# Patient Record
Sex: FEMALE | Race: OTHER | URBAN - METROPOLITAN AREA
[De-identification: names, ages, dates, MRNs, and addresses within clinical notes are randomized per-mention and may not be internally consistent; named-entity substitution may affect disease eponyms.]

---

## 2021-04-21 ENCOUNTER — INITIAL PRENATAL (OUTPATIENT)
Dept: OBGYN CLINIC | Age: 31
End: 2021-04-21

## 2021-04-21 VITALS — WEIGHT: 121.6 LBS

## 2021-04-21 DIAGNOSIS — O20.0 THREATENED ABORTION: Primary | ICD-10-CM

## 2021-04-21 PROBLEM — Z3A.01 LESS THAN 8 WEEKS GESTATION OF PREGNANCY: Status: ACTIVE | Noted: 2021-04-21

## 2021-04-21 PROCEDURE — 99213 OFFICE O/P EST LOW 20 MIN: CPT | Performed by: ADVANCED PRACTICE MIDWIFE

## 2021-04-21 NOTE — PATIENT INSTRUCTIONS
Weeks 6 to 10 of Your Pregnancy: Care Instructions  Overview     During the first 6 to 10 weeks of your pregnancy, your body goes through many changes. Your baby grows very quickly, even though you can't feel it yet. You may start to feel different, both in your body and your emotions. Because each pregnancy is unique, there's no right way to feel. You may feel the healthiest you've ever been, or you might feel tired or sick to your stomach (\"morning sickness\"). These early weeks are a time to make healthy choices and to eat the best foods for you and your baby. This is also a good time to think about birth defects testing. These are tests done during pregnancy to look for possible problems with the baby. First-trimester tests for birth defects can be done between 8 and 13 weeks of pregnancy, depending on the test. Talk with your doctor about what kinds of tests are available. Follow-up care is a key part of your treatment and safety. Be sure to make and go to all appointments, and call your doctor if you are having problems. It's also a good idea to know your test results and keep a list of the medicines you take. How can you care for yourself at home? Eat well  · Eat at least 3 meals and 2 healthy snacks every day. Eat fresh, whole foods, including:  ? 7 or more servings of bread, tortillas, cereal, rice, pasta, or oatmeal.  ? 3 or more servings of vegetables, especially leafy green vegetables. ? 2 or more servings of fruits. ? 3 or more servings of milk, yogurt, or cheese. ? 2 or more servings of meat, turkey, chicken, fish, eggs, or dried beans. · Drink plenty of fluids, especially water. Avoid sodas and other sweetened drinks. · Choose foods that have important vitamins for your baby, such as calcium, iron, and folate. ? Dairy products, tofu, canned fish with bones, almonds, broccoli, dark leafy greens, corn tortillas, and fortified orange juice are good sources of calcium. ?  Beef, poultry, liver, spinach, lentils, dried beans, fortified cereals, and dried fruits are rich in iron. ? Dark leafy greens, broccoli, asparagus, liver, fortified cereals, orange juice, peanuts, and almonds are good sources of folate. · Avoid foods that could harm your baby. ? Do not eat raw or undercooked meat, chicken, or fish (such as sushi or raw oysters). ? Do not eat raw eggs or foods that contain raw eggs, such as Caesar dressing. ? Do not eat soft cheeses and unpasteurized dairy foods, such as Brie, feta, or blue cheese. ? Do not eat fish that contains a lot of mercury, such as shark, swordfish, tilefish, or kimberly mackerel. Do not eat more than 6 ounces of tuna each week. ? Do not eat raw sprouts, especially alfalfa sprouts. ? Cut down on caffeine, such as coffee, tea, and cola. Protect yourself and your baby  · Do not touch merced litter or cat feces. They can cause an infection that could harm your baby. · High body temperature can be harmful to your baby. So if you want to use a sauna or hot tub, be sure to talk to your doctor about how to use it safely. Ava with morning sickness  · Sip small amounts of water, juices, or shakes. Try drinking between meals, not with meals. · Eat 5 or 6 small meals a day. Try dry toast or crackers when you first get up, and eat breakfast a little later. · Avoid spicy, greasy, and fatty foods. · When you feel sick, open your windows or go for a short walk to get fresh air. · Try nausea wristbands. These help some women. · Tell your doctor if you think your prenatal vitamins make you sick. Where can you learn more? Go to http://www.gray.com/  Enter G112 in the search box to learn more about \"Weeks 6 to 10 of Your Pregnancy: Care Instructions. \"  Current as of: October 8, 2020               Content Version: 12.8  © 4748-0265 [employer].    Care instructions adapted under license by [employer] (which disclaims liability or warranty for this information). If you have questions about a medical condition or this instruction, always ask your healthcare professional. [place] [address] any warranty or liability for your use of this information.

## 2021-04-21 NOTE — PROGRESS NOTES
Threatened AB note    Charito Luu is a ,  26 y.o. female OTHER No LMP recorded. Patient is pregnant. Very early had Beta of 453 2 weeks ago       TV ULTRASOUND PERFORMED 21  A POSSIBLE GESTATIONAL SAC IS VISUALIZED IN THE RIGHT ARCH OF THE ENDOMETRIUM. POSSIBLE  ARCUATE UTERUS. EARLY GESTATION VS BLIGHTED OVUM. CLINICA CORRELATION RECOMMENDED. RIGHT OVARY APPEARS WNL. LEFT OVARY APPEARS WNL. NO FREE FLUID SEEN IN THE CDS. No past medical history on file. No past surgical history on file. Social History     Occupational History    Not on file   Tobacco Use    Smoking status: Not on file   Substance and Sexual Activity    Alcohol use: Not on file    Drug use: Not on file    Sexual activity: Not on file     No family history on file.     Not on File  Prior to Admission medications    Not on File        Review of Systems: History obtained from the patient  Constitutional: negative for weight loss, fever, night sweats  Breast: negative for breast lumps, nipple discharge, galactorrhea  GI: negative for change in bowel habits, abdominal pain, black or bloody stools  : negative for frequency, dysuria, hematuria, vaginal discharge  MSK: negative for back pain, joint pain, muscle pain  Skin: negative for itching, rash, hives  Psych: negative for anxiety, depression, change in mood      Objective:  Visit Vitals  Wt 121 lb 9.6 oz (55.2 kg)       Physical Exam:   PHYSICAL EXAMINATION    Constitutional  · Appearance: well-nourished, well developed, alert, in no acute distress    Gastrointestinal  · Abdominal Examination: abdomen non-tender to palpation, normal bowel sounds, no masses present  · Liver and spleen: no hepatomegaly present, spleen not palpable  · Hernias: no hernias identified    Genitourinary  · External Genitalia: normal appearance for age, no discharge present, no tenderness present, no inflammatory lesions present, no masses present, no atrophy present  · Vagina: normal vaginal vault without central or paravaginal defects, bloody discharge present, no inflammatory lesions present, no masses present  · Bladder: non-tender to palpation  · Urethra: appears normal  · Cervix: normal   · Uterus: enlarged, soft, mildly tender with normal shape and consistency  · Adnexa: no adnexal tenderness present, no adnexal masses present  · Perineum: perineum within normal limits, no evidence of trauma, no rashes or skin lesions present  · Anus: anus within normal limits, no hemorrhoids present  · Inguinal Lymph Nodes: no lymphadenopathy present    Skin  · General Inspection: no rash, no lesions identified    Neurologic/Psychiatric  · Mental Status:  · Orientation: grossly oriented to person, place and time  · Mood and Affect: mood normal, affect appropriate    Assessment/Plan:  Threatened AB  Offered BHCG versus repeat scan in 3 weeks - pt and  would prefer repeat scan     Chris Ramirez CNM

## 2022-03-19 PROBLEM — Z3A.01 LESS THAN 8 WEEKS GESTATION OF PREGNANCY: Status: ACTIVE | Noted: 2021-04-21

## 2023-08-03 LAB
C. TRACHOMATIS, EXTERNAL RESULT: NEGATIVE
HEP B, EXTERNAL RESULT: NEGATIVE
HEPATITIS C ANTIBODY, EXTERNAL RESULT: NON REACTIVE
HIV, EXTERNAL RESULT: NON REACTIVE
N. GONORRHOEAE, EXTERNAL RESULT: NEGATIVE
RUBELLA TITER, EXTERNAL RESULT: NORMAL
T. PALLIDUM (SYPHILIS) ANTIBODY, EXTERNAL RESULT: NON REACTIVE

## 2023-11-18 LAB — GBS, EXTERNAL RESULT: NEGATIVE

## 2024-01-24 ENCOUNTER — HOSPITAL ENCOUNTER (OUTPATIENT)
Facility: HOSPITAL | Age: 34
Discharge: HOME | End: 2024-01-24
Attending: OBSTETRICS & GYNECOLOGY | Admitting: OBSTETRICS & GYNECOLOGY
Payer: COMMERCIAL

## 2024-01-24 VITALS
DIASTOLIC BLOOD PRESSURE: 77 MMHG | HEART RATE: 102 BPM | SYSTOLIC BLOOD PRESSURE: 124 MMHG | RESPIRATION RATE: 15 BRPM | OXYGEN SATURATION: 98 % | TEMPERATURE: 97.7 F

## 2024-01-24 LAB
BASOPHILS # BLD: 0 K/UL (ref 0–0.1)
BASOPHILS NFR BLD: 0 % (ref 0–1)
DIFFERENTIAL METHOD BLD: ABNORMAL
EOSINOPHIL # BLD: 0.1 K/UL (ref 0–0.4)
EOSINOPHIL NFR BLD: 2 % (ref 0–7)
ERYTHROCYTE [DISTWIDTH] IN BLOOD BY AUTOMATED COUNT: 13.4 % (ref 11.5–14.5)
HCT VFR BLD AUTO: 34.5 % (ref 35–47)
HGB BLD-MCNC: 11.8 G/DL (ref 11.5–16)
IMM GRANULOCYTES # BLD AUTO: 0.1 K/UL (ref 0–0.04)
IMM GRANULOCYTES NFR BLD AUTO: 2 % (ref 0–0.5)
LYMPHOCYTES # BLD: 1.2 K/UL (ref 0.8–3.5)
LYMPHOCYTES NFR BLD: 20 % (ref 12–49)
MCH RBC QN AUTO: 30 PG (ref 26–34)
MCHC RBC AUTO-ENTMCNC: 34.2 G/DL (ref 30–36.5)
MCV RBC AUTO: 87.8 FL (ref 80–99)
MONOCYTES # BLD: 0.6 K/UL (ref 0–1)
MONOCYTES NFR BLD: 10 % (ref 5–13)
NEUTS SEG # BLD: 4.1 K/UL (ref 1.8–8)
NEUTS SEG NFR BLD: 66 % (ref 32–75)
NRBC # BLD: 0 K/UL (ref 0–0.01)
NRBC BLD-RTO: 0 PER 100 WBC
PLATELET # BLD AUTO: 174 K/UL (ref 150–400)
PMV BLD AUTO: 11.8 FL (ref 8.9–12.9)
RBC # BLD AUTO: 3.93 M/UL (ref 3.8–5.2)
WBC # BLD AUTO: 6.1 K/UL (ref 3.6–11)

## 2024-01-24 PROCEDURE — 85025 COMPLETE CBC W/AUTO DIFF WBC: CPT

## 2024-01-24 PROCEDURE — 36415 COLL VENOUS BLD VENIPUNCTURE: CPT

## 2024-01-24 PROCEDURE — 6360000002 HC RX W HCPCS: Performed by: OBSTETRICS & GYNECOLOGY

## 2024-01-24 PROCEDURE — 86901 BLOOD TYPING SEROLOGIC RH(D): CPT

## 2024-01-24 PROCEDURE — 86900 BLOOD TYPING SEROLOGIC ABO: CPT

## 2024-01-24 PROCEDURE — 86850 RBC ANTIBODY SCREEN: CPT

## 2024-01-24 RX ORDER — TERBUTALINE SULFATE 1 MG/ML
0.25 INJECTION, SOLUTION SUBCUTANEOUS ONCE
Status: COMPLETED | OUTPATIENT
Start: 2024-01-24 | End: 2024-01-24

## 2024-01-24 RX ADMIN — TERBUTALINE SULFATE 0.25 MG: 1 INJECTION, SOLUTION SUBCUTANEOUS at 09:59

## 2024-01-24 NOTE — H&P
History & Physical    Name: Beatrice Soto MRN: [id number]  SSN: [id number]    YOB: 1990  Age: 33 y.o.  Sex: female        Subjective:     Chief Complaint:  Pregnancy and Breech presentation    Estimated Date of Delivery: None noted.  OB History    Para Term  AB Living   2             SAB IAB Ectopic Molar Multiple Live Births                    # Outcome Date GA Lbr Kt/2nd Weight Sex Delivery Anes PTL Lv   1 Current                Ms. Soto is a  admitted with pregnancy at 36  for planned external cephalic version for breech presentation.  She notes active FM and feels good today.  She has no complaints.    Baby has been persistently breech, last u/s yesterday.  CRICKET 17cm.  Anterior placenta.  We discussed risk and benefits of  section vs attempt at ECV and discussed success rates etc.  She would like to proceed with ECV.     Prenatal course was normal - this is an IVF pregnancy and she has been followed with growth scans and now is having weekly surveillance which has been reassuring.      Please see prenatal records which have also been sent to Labor and Delivery and added to Epic for details.    No past medical history on file.  No past surgical history on file.  Social History     Occupational History    Not on file   Tobacco Use    Smoking status: Never    Smokeless tobacco: Never   Vaping Use    Vaping Use: Never used   Substance and Sexual Activity    Alcohol use: Never    Drug use: Never    Sexual activity: Defer     No family history on file.    No Known Allergies  Prior to Admission medications    Not on File        Review of Systems: Pertinent items are noted in HPI.    Objective:     Vitals:  Vitals:    24 0838   BP: 124/77   Pulse: (!) 102   Resp: 15   Temp: 97.7 °F (36.5 °C)   TempSrc: Oral   SpO2: 98%         /77   Pulse (!) 102   Temp 97.7 °F (36.5 °C) (Oral)   Resp 15   SpO2 98%     Lungs:   Respiratory non labored   Heart:   No

## 2024-01-24 NOTE — DISCHARGE INSTRUCTIONS
Week 38 of Your Pregnancy: Care Instructions  Believe it or not, your baby is almost here. You may notice how your baby responds to sounds, warmth, cold, and light. You may even know what kind of music your baby likes.    Even if you expect a vaginal birth, it's a good idea to learn about  section ().  is the delivery of a baby through a cut (incision) in your belly. It's a major surgery, so it has more risks than a vaginal birth.   During the first 2 weeks after the birth, limit visitors. Don't allow visitors who have colds or infections. Ask visitors to wash their hands before they touch your baby. And never let anyone smoke around your baby.     Know about unplanned C-sections.  Reasons for an unplanned  include labor that slows or stops, signs of distress in your baby, and high blood pressure or other problems for you.     Know about planned C-sections.  If your baby isn't in a head-down position for delivery (breech position), your doctor may plan a . Or you may have a planned  if you're having twins or more.     Get as much help as you can while you're in the hospital.  You can learn about feeding, diapering, and bathing your baby.     Talk to your doctor or midwife about how to care for the umbilical cord stump.  If your baby will be circumcised, also ask about how to care for that.     Ask friends or family for help, as you need it.  If you can, have another adult in your home for at least 2 or 3 days after the birth.     Try to nap when your baby naps.  This may be your best chance to get some sleep.     Watch for changes in your mental health.  For the first 1 to 2 weeks after birth, it's common to cry or feel sad or irritable. If these feelings last for more than 2 weeks, you may have postpartum depression. Ask your doctor for help. Postpartum depression can be treated.   Follow-up care is a key part of your treatment and safety. Be sure to make

## 2024-01-24 NOTE — PROGRESS NOTES
0830 - Patient arrived to Labor and Delivery for scheduled external cephalic version.  0917 - Dr Alexey Matta at bedside.    0923 - Verified by Ultrasound baby is breech.    0959 - Dr. Mack and Dr. Matta at bedside for ECV.  1001 - ECV started    1002 - Fetal Heartbeat checked by ultrasound. reassuring    1002 - Position and fetal heart tone check by ultrasound.  Reassuring  1003 - Position and fetal heart tone check by ultrasound. Reassuring.  1004 - Pause procedure for break for Mom.  1005 - Position and fetal heart tone check by ultrasound reassuring  1005 - Restarted procedure    1007 - Position and etal heart tone check by ultrasound. Reassuring  1008 - Position and etal heart tone check by ultrasound. Reassuring   1009 - Vertex confirmed by ultrasound by ultrasound and fetal heart tone reassuring.      Induction will be scheduled.    1125 - Consulted Dr. Matta concerning the contractions seen on the monitor from [facility].  The patient does not feel the contractions that show on the monitor.     Per Dr. Matta we will continue to monitor and give a liter of fluids and offer food.    Fluids are started and patient does not want to eat anything right now.

## 2024-01-24 NOTE — PROCEDURES
External Cephalic Version Procedure Note  Patient - Beatrice Soto  Medical Record Number - [id number]   YOB: 1990      DATE AND TIME OF PROCEDURE: 1/24/2024  10:00am    PREOPERATIVE DIAGNOSIS:   Breech presentation  IVF pregnancy    POSTOPERATIVE DIAGNOSIS:   S/p external cephalic version, successful, now cephalic presentation  IVF pregnancy    PROCEDURE(S):   External cephalic version     ANESTHESIA: None    SURGEON:  Edwina Matta MD    ASSISTANT: Won Mack MD    COMPLICATIONS: none    FINDINGS: Pregnancy at 38w1d in [place] breech presentation, reactive NST pre and post procedure, successful ECV resulting in baby now in cephalic presentation post procedure      Medications given:   Terbutaline IM    DVT Prophylaxis: None required    Pre Procedure NST:  Baseline 140, moderate variability, + accels, no decels - REACTIVE    Pre Procedure toco: no contractions    Post Procedure NST:  Baseline 140, moderate variability, + accels, no decels - REACTIVE    Post procedure toco: no contractions      Procedure Detail:      After proper patient identification and consent, the patient was placed in supine position and bedside ultrasound was used to confirm baby still in [place] breech presentation.  An NST was performed at the bedside and was REACTIVE.  Pt was consented and questions answered.     Olive oil was used to lubricate the abdomen for the procedure.  One physician's hands were placed around the baby's head and the other physician's hands were placed around the buttocks.  The buttocks were attempted to be lifted out of the pelvic and the head was moved carefully in a forward roll motion.  The head initially moved then went back into it's initial location. Heart beat and position were checked multiple times during the procedure.  Attention was then turned back to the head and decision was made to try a backward roll movement.  The buttocks were elevated out of the pelvis and the head was carefully

## 2024-01-25 LAB
ABO + RH BLD: NORMAL
BLOOD GROUP ANTIBODIES SERPL: NORMAL
SPECIMEN EXP DATE BLD: NORMAL

## 2024-02-05 ENCOUNTER — HOSPITAL ENCOUNTER (INPATIENT)
Facility: HOSPITAL | Age: 34
LOS: 4 days | Discharge: HOME | End: 2024-02-09
Attending: OBSTETRICS & GYNECOLOGY | Admitting: OBSTETRICS & GYNECOLOGY
Payer: COMMERCIAL

## 2024-02-05 DIAGNOSIS — Z98.891 S/P CESAREAN SECTION: Primary | ICD-10-CM

## 2024-02-05 PROBLEM — O09.90 HIGH RISK PREGNANCY, ANTEPARTUM: Status: ACTIVE | Noted: 2024-02-05

## 2024-02-05 PROBLEM — O09.813 PREGNANCY RESULTING FROM IN VITRO FERTILIZATION IN THIRD TRIMESTER: Status: ACTIVE | Noted: 2024-02-05

## 2024-02-05 LAB
ABO + RH BLD: NORMAL
ALBUMIN SERPL-MCNC: 2.8 G/DL (ref 3.5–5)
ALBUMIN/GLOB SERPL: 0.8 (ref 1.1–2.2)
ALP SERPL-CCNC: 262 U/L (ref 45–117)
ALT SERPL-CCNC: 18 U/L (ref 12–78)
ANION GAP SERPL CALC-SCNC: 10 MMOL/L (ref 5–15)
AST SERPL-CCNC: 19 U/L (ref 15–37)
BASOPHILS # BLD: 0 K/UL (ref 0–0.1)
BASOPHILS NFR BLD: 0 % (ref 0–1)
BILIRUB SERPL-MCNC: 0.3 MG/DL (ref 0.2–1)
BLOOD GROUP ANTIBODIES SERPL: NORMAL
BUN SERPL-MCNC: 6 MG/DL (ref 6–20)
BUN/CREAT SERPL: 11 (ref 12–20)
CALCIUM SERPL-MCNC: 8.9 MG/DL (ref 8.5–10.1)
CHLORIDE SERPL-SCNC: 108 MMOL/L (ref 97–108)
CO2 SERPL-SCNC: 22 MMOL/L (ref 21–32)
CREAT SERPL-MCNC: 0.54 MG/DL (ref 0.55–1.02)
DIFFERENTIAL METHOD BLD: ABNORMAL
EOSINOPHIL # BLD: 0.1 K/UL (ref 0–0.4)
EOSINOPHIL NFR BLD: 2 % (ref 0–7)
ERYTHROCYTE [DISTWIDTH] IN BLOOD BY AUTOMATED COUNT: 13.2 % (ref 11.5–14.5)
GLOBULIN SER CALC-MCNC: 3.6 G/DL (ref 2–4)
GLUCOSE SERPL-MCNC: 88 MG/DL (ref 65–100)
HCT VFR BLD AUTO: 35.4 % (ref 35–47)
HGB BLD-MCNC: 11.7 G/DL (ref 11.5–16)
IMM GRANULOCYTES # BLD AUTO: 0.1 K/UL (ref 0–0.04)
IMM GRANULOCYTES NFR BLD AUTO: 1 % (ref 0–0.5)
LYMPHOCYTES # BLD: 1.1 K/UL (ref 0.8–3.5)
LYMPHOCYTES NFR BLD: 19 % (ref 12–49)
MCH RBC QN AUTO: 29.7 PG (ref 26–34)
MCHC RBC AUTO-ENTMCNC: 33.1 G/DL (ref 30–36.5)
MCV RBC AUTO: 89.8 FL (ref 80–99)
MONOCYTES # BLD: 0.5 K/UL (ref 0–1)
MONOCYTES NFR BLD: 9 % (ref 5–13)
NEUTS SEG # BLD: 4 K/UL (ref 1.8–8)
NEUTS SEG NFR BLD: 69 % (ref 32–75)
NRBC # BLD: 0 K/UL (ref 0–0.01)
NRBC BLD-RTO: 0 PER 100 WBC
PLATELET # BLD AUTO: 194 K/UL (ref 150–400)
PMV BLD AUTO: 11.2 FL (ref 8.9–12.9)
POTASSIUM SERPL-SCNC: 4.2 MMOL/L (ref 3.5–5.1)
PROT SERPL-MCNC: 6.4 G/DL (ref 6.4–8.2)
RBC # BLD AUTO: 3.94 M/UL (ref 3.8–5.2)
SODIUM SERPL-SCNC: 140 MMOL/L (ref 136–145)
SPECIMEN EXP DATE BLD: NORMAL
WBC # BLD AUTO: 5.8 K/UL (ref 3.6–11)

## 2024-02-05 PROCEDURE — 1100000000 HC RM PRIVATE

## 2024-02-05 PROCEDURE — C1726 CATH, BAL DIL, NON-VASCULAR: HCPCS

## 2024-02-05 PROCEDURE — 86900 BLOOD TYPING SEROLOGIC ABO: CPT

## 2024-02-05 PROCEDURE — 85025 COMPLETE CBC W/AUTO DIFF WBC: CPT

## 2024-02-05 PROCEDURE — 59200 INSERT CERVICAL DILATOR: CPT

## 2024-02-05 PROCEDURE — 86901 BLOOD TYPING SEROLOGIC RH(D): CPT

## 2024-02-05 PROCEDURE — 6360000002 HC RX W HCPCS: Performed by: OBSTETRICS & GYNECOLOGY

## 2024-02-05 PROCEDURE — 36415 COLL VENOUS BLD VENIPUNCTURE: CPT

## 2024-02-05 PROCEDURE — 86850 RBC ANTIBODY SCREEN: CPT

## 2024-02-05 PROCEDURE — 7210000100 HC LABOR FEE PER 1 HR

## 2024-02-05 PROCEDURE — 6370000000 HC RX 637 (ALT 250 FOR IP): Performed by: OBSTETRICS & GYNECOLOGY

## 2024-02-05 PROCEDURE — 80053 COMPREHEN METABOLIC PANEL: CPT

## 2024-02-05 RX ORDER — SODIUM CHLORIDE 0.9 % (FLUSH) 0.9 %
5-40 SYRINGE (ML) INJECTION EVERY 12 HOURS SCHEDULED
Status: DISCONTINUED | OUTPATIENT
Start: 2024-02-05 | End: 2024-02-07 | Stop reason: HOSPADM

## 2024-02-05 RX ORDER — METHYLERGONOVINE MALEATE 0.2 MG/ML
200 INJECTION INTRAVENOUS PRN
Status: DISCONTINUED | OUTPATIENT
Start: 2024-02-05 | End: 2024-02-07

## 2024-02-05 RX ORDER — SODIUM CHLORIDE, SODIUM LACTATE, POTASSIUM CHLORIDE, CALCIUM CHLORIDE 600; 310; 30; 20 MG/100ML; MG/100ML; MG/100ML; MG/100ML
INJECTION, SOLUTION INTRAVENOUS CONTINUOUS
Status: DISCONTINUED | OUTPATIENT
Start: 2024-02-05 | End: 2024-02-07

## 2024-02-05 RX ORDER — SODIUM CHLORIDE, SODIUM LACTATE, POTASSIUM CHLORIDE, AND CALCIUM CHLORIDE .6; .31; .03; .02 G/100ML; G/100ML; G/100ML; G/100ML
500 INJECTION, SOLUTION INTRAVENOUS PRN
Status: DISCONTINUED | OUTPATIENT
Start: 2024-02-05 | End: 2024-02-07 | Stop reason: HOSPADM

## 2024-02-05 RX ORDER — ASPIRIN 81 MG/1
81 TABLET, CHEWABLE ORAL DAILY
Status: ON HOLD | COMMUNITY
End: 2024-02-07

## 2024-02-05 RX ORDER — SODIUM CHLORIDE 9 MG/ML
25 INJECTION, SOLUTION INTRAVENOUS PRN
Status: DISCONTINUED | OUTPATIENT
Start: 2024-02-05 | End: 2024-02-07 | Stop reason: HOSPADM

## 2024-02-05 RX ORDER — ONDANSETRON 2 MG/ML
4 INJECTION INTRAMUSCULAR; INTRAVENOUS EVERY 6 HOURS PRN
Status: DISCONTINUED | OUTPATIENT
Start: 2024-02-05 | End: 2024-02-07

## 2024-02-05 RX ORDER — SODIUM CHLORIDE 0.9 % (FLUSH) 0.9 %
5-40 SYRINGE (ML) INJECTION PRN
Status: DISCONTINUED | OUTPATIENT
Start: 2024-02-05 | End: 2024-02-07 | Stop reason: HOSPADM

## 2024-02-05 RX ORDER — CARBOPROST TROMETHAMINE 250 UG/ML
250 INJECTION, SOLUTION INTRAMUSCULAR PRN
Status: DISCONTINUED | OUTPATIENT
Start: 2024-02-05 | End: 2024-02-07

## 2024-02-05 RX ORDER — DOCUSATE SODIUM 100 MG/1
100 CAPSULE, LIQUID FILLED ORAL 2 TIMES DAILY
Status: DISCONTINUED | OUTPATIENT
Start: 2024-02-05 | End: 2024-02-07

## 2024-02-05 RX ORDER — ACETAMINOPHEN 325 MG/1
650 TABLET ORAL EVERY 4 HOURS PRN
Status: DISCONTINUED | OUTPATIENT
Start: 2024-02-05 | End: 2024-02-07 | Stop reason: HOSPADM

## 2024-02-05 RX ORDER — SODIUM CHLORIDE, SODIUM LACTATE, POTASSIUM CHLORIDE, AND CALCIUM CHLORIDE .6; .31; .03; .02 G/100ML; G/100ML; G/100ML; G/100ML
1000 INJECTION, SOLUTION INTRAVENOUS PRN
Status: DISCONTINUED | OUTPATIENT
Start: 2024-02-05 | End: 2024-02-07 | Stop reason: HOSPADM

## 2024-02-05 RX ORDER — MISOPROSTOL 200 UG/1
800 TABLET ORAL PRN
Status: DISCONTINUED | OUTPATIENT
Start: 2024-02-05 | End: 2024-02-07

## 2024-02-05 RX ORDER — HYDROMORPHONE HYDROCHLORIDE 1 MG/ML
1 INJECTION, SOLUTION INTRAMUSCULAR; INTRAVENOUS; SUBCUTANEOUS
Status: DISCONTINUED | OUTPATIENT
Start: 2024-02-05 | End: 2024-02-07

## 2024-02-05 RX ADMIN — Medication 25 MCG: at 19:17

## 2024-02-05 RX ADMIN — HYDROMORPHONE HYDROCHLORIDE 1 MG: 1 INJECTION, SOLUTION INTRAMUSCULAR; INTRAVENOUS; SUBCUTANEOUS at 21:57

## 2024-02-05 RX ADMIN — ONDANSETRON 4 MG: 2 INJECTION INTRAMUSCULAR; INTRAVENOUS at 21:58

## 2024-02-05 NOTE — PROGRESS NOTES
1730: Patient here for scheduled induction for IVF in pregancy.    1835: Bedside ultrasound confirmed vertex presentation. SVE fingertip/30/high. Perez balloon placed with 60/60 by Dr Mansfield. Plan to leave in until the doctor rounds in the am or if it falls out before.     1930: Bedside and Verbal shift change report given to JOSE MIGUEL Mahajan RN (oncoming nurse) by JONAS Myers RN (offgoing nurse). Report included the following information ED SBAR, MAR, and Recent Results.

## 2024-02-05 NOTE — PROGRESS NOTES
FHTs: 140, moderate variability, positive accelerations, no decelerations  [place]: no contractions    Cervix ftp/30%/-4  Sterile speculum placed, cook placed under direct visualization  60mL sterile saline placed in both the uterine and vaginal balloons

## 2024-02-05 NOTE — H&P
History & Physical    Name: Beatrice Soto MRN: [id number]  SSN: [id number]    YOB: 1990  Age: 33 y.o.  Sex: female      Subjective:     Chief Complaint:  none    Estimated Date of Delivery: 24  OB History    Para Term  AB Living   2             SAB IAB Ectopic Molar Multiple Live Births                    # Outcome Date GA Lbr Kt/2nd Weight Sex Delivery Anes PTL Lv   1 Current                HPI:  32yo  @ 39w6d presents for scheduled IOL due to IVF pregnancy.  She has no labor complaints.     Of note, baby was previously breech.  Last week ECV was successful and the patient feels that her baby is still vertex.      Past Medical History:   Diagnosis Date    Infertility, female     please dont discuss in front of anyone    Pregnancy resulting from in vitro fertilization in third trimester 2024     History reviewed. No pertinent surgical history.  Social History     Occupational History    Not on file   Tobacco Use    Smoking status: Never    Smokeless tobacco: Never   Vaping Use    Vaping Use: Never used   Substance and Sexual Activity    Alcohol use: Never    Drug use: Never    Sexual activity: Defer     History reviewed. No pertinent family history.    No Known Allergies  Prior to Admission medications    Medication Sig Start Date End Date Taking? Authorizing Provider   Prenatal MV-Min-Fe Fum-FA-DHA (PRENATAL 1 PO) Take by mouth   Yes Katherine Santos MD   aspirin 81 MG chewable tablet Take 1 tablet by mouth daily   Yes ProviderKatherine MD        Review of Systems: A comprehensive review of systems was negative except for that written in the History of Present Illness.    Objective:     Vitals:  Vitals:    24 1750   Weight: 71.2 kg (157 lb)   Height: 1.6 m (5' 3\")         Ht 1.6 m (5' 3\")   Wt 71.2 kg (157 lb)   LMP 2023   BMI 27.81 kg/m²       Lungs:   Respiratory non labored   Heart:   No clubbing, cyanosis or edema   Abdomen:  Gravid

## 2024-02-06 ENCOUNTER — ANESTHESIA (OUTPATIENT)
Facility: HOSPITAL | Age: 34
End: 2024-02-06
Payer: COMMERCIAL

## 2024-02-06 ENCOUNTER — ANESTHESIA EVENT (OUTPATIENT)
Facility: HOSPITAL | Age: 34
End: 2024-02-06
Payer: COMMERCIAL

## 2024-02-06 LAB
CREAT UR-MCNC: 63.1 MG/DL
PROT UR-MCNC: 37 MG/DL (ref 0–11.9)
PROT/CREAT UR-RTO: 0.6

## 2024-02-06 PROCEDURE — 2580000003 HC RX 258: Performed by: ANESTHESIOLOGY

## 2024-02-06 PROCEDURE — 7210000100 HC LABOR FEE PER 1 HR

## 2024-02-06 PROCEDURE — 6360000002 HC RX W HCPCS: Performed by: ANESTHESIOLOGY

## 2024-02-06 PROCEDURE — 3700000025 EPIDURAL BLOCK: Performed by: STUDENT IN AN ORGANIZED HEALTH CARE EDUCATION/TRAINING PROGRAM

## 2024-02-06 PROCEDURE — 82570 ASSAY OF URINE CREATININE: CPT

## 2024-02-06 PROCEDURE — 2580000003 HC RX 258: Performed by: OBSTETRICS & GYNECOLOGY

## 2024-02-06 PROCEDURE — 6370000000 HC RX 637 (ALT 250 FOR IP): Performed by: ADVANCED PRACTICE MIDWIFE

## 2024-02-06 PROCEDURE — 1100000000 HC RM PRIVATE

## 2024-02-06 PROCEDURE — 84156 ASSAY OF PROTEIN URINE: CPT

## 2024-02-06 PROCEDURE — 51702 INSERT TEMP BLADDER CATH: CPT

## 2024-02-06 PROCEDURE — 6360000002 HC RX W HCPCS: Performed by: OBSTETRICS & GYNECOLOGY

## 2024-02-06 RX ORDER — FENTANYL CITRATE 50 UG/ML
INJECTION, SOLUTION INTRAMUSCULAR; INTRAVENOUS
Status: DISCONTINUED
Start: 2024-02-06 | End: 2024-02-07

## 2024-02-06 RX ORDER — FENTANYL CITRATE 50 UG/ML
INJECTION, SOLUTION INTRAMUSCULAR; INTRAVENOUS PRN
Status: DISCONTINUED | OUTPATIENT
Start: 2024-02-06 | End: 2024-02-07 | Stop reason: SDUPTHER

## 2024-02-06 RX ORDER — ONDANSETRON 2 MG/ML
4 INJECTION INTRAMUSCULAR; INTRAVENOUS EVERY 6 HOURS PRN
Status: DISCONTINUED | OUTPATIENT
Start: 2024-02-06 | End: 2024-02-07 | Stop reason: HOSPADM

## 2024-02-06 RX ORDER — EPHEDRINE SULFATE 50 MG/ML
INJECTION INTRAVENOUS
Status: COMPLETED
Start: 2024-02-06 | End: 2024-02-07

## 2024-02-06 RX ORDER — BUPIVACAINE HYDROCHLORIDE 5 MG/ML
INJECTION, SOLUTION EPIDURAL; INTRACAUDAL
Status: DISCONTINUED
Start: 2024-02-06 | End: 2024-02-07

## 2024-02-06 RX ORDER — LIDOCAINE HCL/EPINEPHRINE/PF 2%-1:200K
VIAL (ML) INJECTION
Status: DISCONTINUED
Start: 2024-02-06 | End: 2024-02-07

## 2024-02-06 RX ORDER — FENTANYL 0.2 MG/100ML-BUPIV 0.125%-NACL 0.9% EPIDURAL INJ 2/0.125 MCG/ML-%
1-15 SOLUTION INJECTION CONTINUOUS
Status: DISCONTINUED | OUTPATIENT
Start: 2024-02-06 | End: 2024-02-07

## 2024-02-06 RX ORDER — BUPIVACAINE HYDROCHLORIDE 5 MG/ML
INJECTION, SOLUTION EPIDURAL; INTRACAUDAL PRN
Status: DISCONTINUED | OUTPATIENT
Start: 2024-02-06 | End: 2024-02-07 | Stop reason: SDUPTHER

## 2024-02-06 RX ORDER — LIDOCAINE HCL/EPINEPHRINE/PF 2%-1:200K
VIAL (ML) INJECTION PRN
Status: DISCONTINUED | OUTPATIENT
Start: 2024-02-06 | End: 2024-02-07 | Stop reason: SDUPTHER

## 2024-02-06 RX ORDER — EPHEDRINE SULFATE 50 MG/ML
10 INJECTION INTRAVENOUS
Status: DISCONTINUED | OUTPATIENT
Start: 2024-02-06 | End: 2024-02-07 | Stop reason: HOSPADM

## 2024-02-06 RX ORDER — CALCIUM CARBONATE 500 MG/1
1000 TABLET, CHEWABLE ORAL ONCE
Status: COMPLETED | OUTPATIENT
Start: 2024-02-06 | End: 2024-02-06

## 2024-02-06 RX ORDER — NALOXONE HYDROCHLORIDE 0.4 MG/ML
INJECTION, SOLUTION INTRAMUSCULAR; INTRAVENOUS; SUBCUTANEOUS PRN
Status: DISCONTINUED | OUTPATIENT
Start: 2024-02-06 | End: 2024-02-07 | Stop reason: HOSPADM

## 2024-02-06 RX ORDER — DIPHENHYDRAMINE HYDROCHLORIDE 50 MG/ML
12.5 INJECTION INTRAMUSCULAR; INTRAVENOUS EVERY 4 HOURS PRN
Status: DISCONTINUED | OUTPATIENT
Start: 2024-02-06 | End: 2024-02-07 | Stop reason: HOSPADM

## 2024-02-06 RX ORDER — BUPIVACAINE HYDROCHLORIDE 5 MG/ML
INJECTION, SOLUTION EPIDURAL; INTRACAUDAL
Status: COMPLETED
Start: 2024-02-06 | End: 2024-02-06

## 2024-02-06 RX ADMIN — BUPIVACAINE HYDROCHLORIDE 5 ML: 5 INJECTION, SOLUTION EPIDURAL; INTRACAUDAL; PERINEURAL at 19:19

## 2024-02-06 RX ADMIN — CALCIUM CARBONATE (ANTACID) CHEW TAB 500 MG 1000 MG: 500 CHEW TAB at 20:58

## 2024-02-06 RX ADMIN — SODIUM CHLORIDE, POTASSIUM CHLORIDE, SODIUM LACTATE AND CALCIUM CHLORIDE: 600; 310; 30; 20 INJECTION, SOLUTION INTRAVENOUS at 14:17

## 2024-02-06 RX ADMIN — FENTANYL CITRATE 10 ML/HR: 0.05 INJECTION, SOLUTION INTRAMUSCULAR; INTRAVENOUS at 21:29

## 2024-02-06 RX ADMIN — SODIUM CHLORIDE, POTASSIUM CHLORIDE, SODIUM LACTATE AND CALCIUM CHLORIDE: 600; 310; 30; 20 INJECTION, SOLUTION INTRAVENOUS at 04:11

## 2024-02-06 RX ADMIN — LIDOCAINE HYDROCHLORIDE AND EPINEPHRINE 5 ML: 20; 5 INJECTION, SOLUTION EPIDURAL; INFILTRATION; INTRACAUDAL; PERINEURAL at 13:09

## 2024-02-06 RX ADMIN — SODIUM CHLORIDE, POTASSIUM CHLORIDE, SODIUM LACTATE AND CALCIUM CHLORIDE: 600; 310; 30; 20 INJECTION, SOLUTION INTRAVENOUS at 11:08

## 2024-02-06 RX ADMIN — FENTANYL CITRATE 10 ML/HR: 0.05 INJECTION, SOLUTION INTRAMUSCULAR; INTRAVENOUS at 13:19

## 2024-02-06 RX ADMIN — FENTANYL CITRATE 100 MCG: 50 INJECTION, SOLUTION INTRAMUSCULAR; INTRAVENOUS at 13:12

## 2024-02-06 RX ADMIN — HYDROMORPHONE HYDROCHLORIDE 1 MG: 1 INJECTION, SOLUTION INTRAMUSCULAR; INTRAVENOUS; SUBCUTANEOUS at 07:23

## 2024-02-06 RX ADMIN — Medication 1 MILLI-UNITS/MIN: at 04:11

## 2024-02-06 RX ADMIN — ONDANSETRON 4 MG: 2 INJECTION INTRAMUSCULAR; INTRAVENOUS at 08:49

## 2024-02-06 RX ADMIN — BUPIVACAINE HYDROCHLORIDE 5 ML: 5 INJECTION, SOLUTION EPIDURAL; INTRACAUDAL; PERINEURAL at 13:12

## 2024-02-06 NOTE — PROGRESS NOTES
Bedside and Verbal shift change report given to ZAK Mahajan RN (oncoming nurse) by MAGGIE Myers RN (offgoing nurse). Report included the following information Nurse Handoff Report.     0730- Bedside and Verbal shift change report given to MAGGIE Oliveira RN (oncoming nurse) by ZAK Mahajan RN (offgoing nurse). Report included the following information Nurse Handoff Report.

## 2024-02-06 NOTE — PROGRESS NOTES
Labor Progress Note    S: Patient resting, received dilaudid about 30 minutes ago, feeling comfortable      O:   Vitals:    24 0800   BP: 135/75   Pulse: 99   Resp: 16   Temp: 97.8 °F (36.6 °C)   SpO2:       FHT: 135 / moderate / + accels / no decels     [place]: q 2-4 minutes     SVE: 3/70/-3, balloon removed   Cephalic presentation confirmed by vscan    34 yo  at 40w0d admitted for IOL for IVF.      IOL   - Cook removed   - Cont pitocin per protocol   - Assess for AROM at next check   - Pain mgmt per patient request     Mild-range BPs - no acute mgmt required, labs wnl.       Zenaida Mcdermott MD

## 2024-02-06 NOTE — PROGRESS NOTES
@ 0730 Bedside and Verbal shift change report given to MAGGIE Oliveira RN (oncoming nurse) by JOSE MIGUEL Mahajan RN (offgoing nurse). Report included the following information Nurse Handoff Report, Adult Overview, Intake/Output, MAR, Recent Results, Med Rec Status, and Event Log.     @ 0805 Dr. Mcdermott at bedside performing SVE (3/70/-3); live bulb removed; VSCAN confirmed vertex position of baby      @ 0810 send PCR once patient urinates     @ 0930 PCR sent to lab     @ 1040 patient is up and moving; on birthing ball/ doing squats     @ 1130 patient completing [employer]     @ 1140 notified MD of PCR result; no new orders received     @ 1152 Dr. Mcdermott at bedside performing SVE (4/70/-2); AROM (moderate amount of clear fluids)     @ 1204 patient on birthing ball     @ 1220 patient resting in bed     @ 1235 patient is ready for epidural     @ 1240 call to anesthesia     @ 3419-7375 anesthesia at bedside    @ 1310 patient turned on left side     @ 1330 patient turned on right side with peanut ball     @ 1432 patient asleep     @ 1500 side lying pelvic release (right side)     @ 1510 live placed     @ 1513 side lying pelvic release (left side)    @ 1524 flying cowgirl (left side)     @ 1652 flying cowgirl (right side)     @ 1716 Dr. Mcdermott at bedside performing SVE (5/80/-2); no Pitocin break recommended at this time; reassess in a couple of hours      @ 1819 exaggerated runner's lunge (left side)     @ 1907 anesthesia at bedside for re-dose    @ 1930 Bedside and Verbal shift change report given to ZAK Mahajan RN (oncoming nurse) by MAGGIE Oliveira RN (offgoing nurse). Report included the following information Nurse Handoff Report, Adult Overview, Intake/Output, MAR, Recent Results, Med Rec Status, and Event Log.

## 2024-02-06 NOTE — PROGRESS NOTES
In to meet pt.  She is starting to hurt with the cytotec and balloon now.  Just starting to have some contractions.  She is undergoing an IOL @ 39 6/7 wks for IVF pregnancy.    /mod freddie/accels present/decels absent  TOCO occ  Cervix deferred    Cont with cytotec/CRB  Dilaudid prn for pain  BP mildly up, maybe pain related, sending CMP

## 2024-02-06 NOTE — PROGRESS NOTES
Labor Progress Note    S: Patient resting, starting to feel contractions more painfully      O:   Vitals:    24 1020   BP: 139/63   Pulse: (!) 115   Resp: 16   Temp: 98.4 °F (36.9 °C)   SpO2:        / mod / + accels / no decels     [place]: q 1-3 minutes      SVE: 4/70/-2     32 yo  at 40w0d admitted for IOL for IVF.     IOL   - Pit at 18   - AROM with patient consent, clear fluid   - Pain mgmt per patient request     PreE w/o SF  - Mild range BPs with PCR 0.6   - Labs wnl, no severe range Bps     Zenaida Mcdermott MD

## 2024-02-06 NOTE — PROGRESS NOTES
Labor Progress Note    S: Patient resting with epidural in place.      O:   Vitals:    24 1639   BP: (!) 124/58   Pulse: 100   Resp: 16   Temp: 97.9 °F (36.6 °C)   SpO2: 97%      FHT: 140 / moderate / + accels / no decels     [place]: q 1-2 minutes      SVE: 5/70/-2     32 yo  at 40w0d admitted for IOL for IVF     IOL   - Epidural in place working well   - Reviewed expectations for cervical change and latent phase of labor. Pitocin at 20. Consider pit break if no change in 4-6 hours.     PreE w/o SF    - Initially with mild range Bps, PCR 0.6, labs wnl   - BPs now normotensive     Zenaida Mcdermott MD

## 2024-02-06 NOTE — ANESTHESIA PRE PROCEDURE
Department of Anesthesiology  Preprocedure Note       Name:  Beatrice Soto   Age:  33 y.o.  :  1990                                          MRN:  [id number]         Date:  2024      Surgeon: * No surgeons listed *    Procedure: * No procedures listed *    Medications prior to admission:   Prior to Admission medications    Medication Sig Start Date End Date Taking? Authorizing Provider   Prenatal MV-Min-Fe Fum-FA-DHA (PRENATAL 1 PO) Take by mouth   Yes ProviderKatherine MD   aspirin 81 MG chewable tablet Take 1 tablet by mouth daily   Yes Provider, MD Katherine       Current medications:    Current Facility-Administered Medications   Medication Dose Route Frequency Provider Last Rate Last Admin    fentaNYL 2 mcg/mL BUPivacaine 0.125% in sodium chloride 0.9% 100 mL epidural infusion  1-15 mL/hr Epidural Continuous Obdulio oMrgan MD        naloxone 0.4 mg in 10 mL sodium chloride syringe   IntraVENous PRN Obdulio Morgan MD        ondansetron (ZOFRAN) injection 4 mg  4 mg IntraVENous Q6H PRN Obdulio Morgan MD        diphenhydrAMINE (BENADRYL) injection 12.5 mg  12.5 mg IntraVENous Q4H PRN Obdulio Morgan MD        ePHEDrine injection 10 mg  10 mg IntraVENous Q15 Min PRN Obdulio Morgan MD        lactated ringers IV soln infusion   IntraVENous Continuous Johanny Mansfield  mL/hr at 24 1108 New Bag at 24 1108    lactated ringers bolus bolus 500 mL  500 mL IntraVENous PRN Johanny Mansfield MD        Or    lactated ringers bolus bolus 1,000 mL  1,000 mL IntraVENous PRN Johanny Mansfield MD        sodium chloride flush 0.9 % injection 5-40 mL  5-40 mL IntraVENous 2 times per day Johanny Mansfield MD        sodium chloride flush 0.9 % injection 5-40 mL  5-40 mL IntraVENous PRN Johanny Mansfield MD        0.9 % sodium chloride infusion  25 mL IntraVENous PRN Johanny Mansfield MD        ondansetron (ZOFRAN) injection 4 mg  4 mg IntraVENous Q6H PRN Johanny Mansfield MD   4 mg at 24 0849

## 2024-02-07 PROCEDURE — 2580000003 HC RX 258: Performed by: STUDENT IN AN ORGANIZED HEALTH CARE EDUCATION/TRAINING PROGRAM

## 2024-02-07 PROCEDURE — 1120000000 HC RM PRIVATE OB

## 2024-02-07 PROCEDURE — 10907ZC DRAINAGE OF AMNIOTIC FLUID, THERAPEUTIC FROM PRODUCTS OF CONCEPTION, VIA NATURAL OR ARTIFICIAL OPENING: ICD-10-PCS | Performed by: STUDENT IN AN ORGANIZED HEALTH CARE EDUCATION/TRAINING PROGRAM

## 2024-02-07 PROCEDURE — 3609079900 HC CESAREAN SECTION: Performed by: STUDENT IN AN ORGANIZED HEALTH CARE EDUCATION/TRAINING PROGRAM

## 2024-02-07 PROCEDURE — 6360000002 HC RX W HCPCS: Performed by: NURSE ANESTHETIST, CERTIFIED REGISTERED

## 2024-02-07 PROCEDURE — 3700000025 EPIDURAL BLOCK: Performed by: STUDENT IN AN ORGANIZED HEALTH CARE EDUCATION/TRAINING PROGRAM

## 2024-02-07 PROCEDURE — 10H07YZ INSERTION OF OTHER DEVICE INTO PRODUCTS OF CONCEPTION, VIA NATURAL OR ARTIFICIAL OPENING: ICD-10-PCS | Performed by: STUDENT IN AN ORGANIZED HEALTH CARE EDUCATION/TRAINING PROGRAM

## 2024-02-07 PROCEDURE — 2580000003 HC RX 258: Performed by: OBSTETRICS & GYNECOLOGY

## 2024-02-07 PROCEDURE — 3E0P7VZ INTRODUCTION OF HORMONE INTO FEMALE REPRODUCTIVE, VIA NATURAL OR ARTIFICIAL OPENING: ICD-10-PCS | Performed by: STUDENT IN AN ORGANIZED HEALTH CARE EDUCATION/TRAINING PROGRAM

## 2024-02-07 PROCEDURE — 6360000002 HC RX W HCPCS: Performed by: STUDENT IN AN ORGANIZED HEALTH CARE EDUCATION/TRAINING PROGRAM

## 2024-02-07 PROCEDURE — 2500000003 HC RX 250 WO HCPCS: Performed by: ANESTHESIOLOGY

## 2024-02-07 PROCEDURE — 3700000000 HC ANESTHESIA ATTENDED CARE: Performed by: STUDENT IN AN ORGANIZED HEALTH CARE EDUCATION/TRAINING PROGRAM

## 2024-02-07 PROCEDURE — 6360000002 HC RX W HCPCS: Performed by: ADVANCED PRACTICE MIDWIFE

## 2024-02-07 PROCEDURE — 6360000002 HC RX W HCPCS: Performed by: OBSTETRICS & GYNECOLOGY

## 2024-02-07 PROCEDURE — 7100000000 HC PACU RECOVERY - FIRST 15 MIN: Performed by: STUDENT IN AN ORGANIZED HEALTH CARE EDUCATION/TRAINING PROGRAM

## 2024-02-07 PROCEDURE — 2580000003 HC RX 258: Performed by: ANESTHESIOLOGY

## 2024-02-07 PROCEDURE — 2580000003 HC RX 258: Performed by: ADVANCED PRACTICE MIDWIFE

## 2024-02-07 PROCEDURE — 3700000001 HC ADD 15 MINUTES (ANESTHESIA): Performed by: STUDENT IN AN ORGANIZED HEALTH CARE EDUCATION/TRAINING PROGRAM

## 2024-02-07 PROCEDURE — 3E033VJ INTRODUCTION OF OTHER HORMONE INTO PERIPHERAL VEIN, PERCUTANEOUS APPROACH: ICD-10-PCS | Performed by: STUDENT IN AN ORGANIZED HEALTH CARE EDUCATION/TRAINING PROGRAM

## 2024-02-07 PROCEDURE — 6360000002 HC RX W HCPCS: Performed by: ANESTHESIOLOGY

## 2024-02-07 PROCEDURE — 2500000003 HC RX 250 WO HCPCS

## 2024-02-07 PROCEDURE — 7100000001 HC PACU RECOVERY - ADDTL 15 MIN: Performed by: STUDENT IN AN ORGANIZED HEALTH CARE EDUCATION/TRAINING PROGRAM

## 2024-02-07 PROCEDURE — 7210000100 HC LABOR FEE PER 1 HR

## 2024-02-07 PROCEDURE — 3E0DXGC INTRODUCTION OF OTHER THERAPEUTIC SUBSTANCE INTO MOUTH AND PHARYNX, EXTERNAL APPROACH: ICD-10-PCS | Performed by: STUDENT IN AN ORGANIZED HEALTH CARE EDUCATION/TRAINING PROGRAM

## 2024-02-07 RX ORDER — MODIFIED LANOLIN
OINTMENT (GRAM) TOPICAL
Status: CANCELLED | OUTPATIENT
Start: 2024-02-07

## 2024-02-07 RX ORDER — LIDOCAINE HCL/EPINEPHRINE/PF 2%-1:200K
VIAL (ML) INJECTION
Status: COMPLETED
Start: 2024-02-07 | End: 2024-02-07

## 2024-02-07 RX ORDER — SWAB
1 SWAB, NON-MEDICATED MISCELLANEOUS DAILY
Status: CANCELLED | OUTPATIENT
Start: 2024-02-07

## 2024-02-07 RX ORDER — DOCUSATE SODIUM 100 MG/1
100 CAPSULE, LIQUID FILLED ORAL 2 TIMES DAILY PRN
Status: DISCONTINUED | OUTPATIENT
Start: 2024-02-07 | End: 2024-02-09 | Stop reason: HOSPADM

## 2024-02-07 RX ORDER — MODIFIED LANOLIN
OINTMENT (GRAM) TOPICAL
Status: DISCONTINUED | OUTPATIENT
Start: 2024-02-07 | End: 2024-02-09 | Stop reason: HOSPADM

## 2024-02-07 RX ORDER — HYDROMORPHONE HYDROCHLORIDE 1 MG/ML
0.25 INJECTION, SOLUTION INTRAMUSCULAR; INTRAVENOUS; SUBCUTANEOUS EVERY 6 HOURS PRN
Status: DISPENSED | OUTPATIENT
Start: 2024-02-07 | End: 2024-02-08

## 2024-02-07 RX ORDER — BUPIVACAINE HYDROCHLORIDE 2.5 MG/ML
INJECTION, SOLUTION EPIDURAL; INFILTRATION; INTRACAUDAL PRN
Status: DISCONTINUED | OUTPATIENT
Start: 2024-02-07 | End: 2024-02-07 | Stop reason: SDUPTHER

## 2024-02-07 RX ORDER — ONDANSETRON 2 MG/ML
INJECTION INTRAMUSCULAR; INTRAVENOUS PRN
Status: DISCONTINUED | OUTPATIENT
Start: 2024-02-07 | End: 2024-02-07 | Stop reason: SDUPTHER

## 2024-02-07 RX ORDER — FAMOTIDINE 20 MG/1
20 TABLET, FILM COATED ORAL 2 TIMES DAILY PRN
Status: CANCELLED | OUTPATIENT
Start: 2024-02-07

## 2024-02-07 RX ORDER — ACETAMINOPHEN 500 MG
1000 TABLET ORAL EVERY 6 HOURS SCHEDULED
Status: DISCONTINUED | OUTPATIENT
Start: 2024-02-08 | End: 2024-02-09 | Stop reason: HOSPADM

## 2024-02-07 RX ORDER — SODIUM CHLORIDE, SODIUM LACTATE, POTASSIUM CHLORIDE, CALCIUM CHLORIDE 600; 310; 30; 20 MG/100ML; MG/100ML; MG/100ML; MG/100ML
INJECTION, SOLUTION INTRAVENOUS CONTINUOUS
Status: DISPENSED | OUTPATIENT
Start: 2024-02-07 | End: 2024-02-08

## 2024-02-07 RX ORDER — SODIUM CHLORIDE 0.9 % (FLUSH) 0.9 %
5-40 SYRINGE (ML) INJECTION PRN
Status: CANCELLED | OUTPATIENT
Start: 2024-02-07

## 2024-02-07 RX ORDER — KETOROLAC TROMETHAMINE 30 MG/ML
30 INJECTION, SOLUTION INTRAMUSCULAR; INTRAVENOUS EVERY 6 HOURS
Status: DISPENSED | OUTPATIENT
Start: 2024-02-07 | End: 2024-02-08

## 2024-02-07 RX ORDER — OXYCODONE HYDROCHLORIDE 5 MG/1
5 TABLET ORAL EVERY 4 HOURS PRN
Status: CANCELLED | OUTPATIENT
Start: 2024-02-08

## 2024-02-07 RX ORDER — ONDANSETRON 4 MG/1
4 TABLET, ORALLY DISINTEGRATING ORAL EVERY 8 HOURS PRN
Status: DISCONTINUED | OUTPATIENT
Start: 2024-02-07 | End: 2024-02-09 | Stop reason: HOSPADM

## 2024-02-07 RX ORDER — OXYCODONE HYDROCHLORIDE 5 MG/1
5 TABLET ORAL EVERY 4 HOURS PRN
Status: DISCONTINUED | OUTPATIENT
Start: 2024-02-08 | End: 2024-02-09 | Stop reason: HOSPADM

## 2024-02-07 RX ORDER — OXYCODONE HYDROCHLORIDE 5 MG/1
10 TABLET ORAL EVERY 4 HOURS PRN
Status: DISCONTINUED | OUTPATIENT
Start: 2024-02-08 | End: 2024-02-09 | Stop reason: HOSPADM

## 2024-02-07 RX ORDER — MISOPROSTOL 200 UG/1
800 TABLET ORAL PRN
Status: DISCONTINUED | OUTPATIENT
Start: 2024-02-07 | End: 2024-02-09 | Stop reason: HOSPADM

## 2024-02-07 RX ORDER — DIPHENHYDRAMINE HYDROCHLORIDE 50 MG/ML
25 INJECTION INTRAMUSCULAR; INTRAVENOUS EVERY 6 HOURS PRN
Status: CANCELLED | OUTPATIENT
Start: 2024-02-07

## 2024-02-07 RX ORDER — SODIUM CHLORIDE 9 MG/ML
INJECTION, SOLUTION INTRAVENOUS PRN
Status: DISCONTINUED | OUTPATIENT
Start: 2024-02-07 | End: 2024-02-09 | Stop reason: HOSPADM

## 2024-02-07 RX ORDER — METHYLERGONOVINE MALEATE 0.2 MG/ML
200 INJECTION INTRAVENOUS PRN
Status: DISCONTINUED | OUTPATIENT
Start: 2024-02-07 | End: 2024-02-09 | Stop reason: HOSPADM

## 2024-02-07 RX ORDER — OXYCODONE HYDROCHLORIDE 5 MG/1
5 TABLET ORAL EVERY 4 HOURS PRN
Status: DISCONTINUED | OUTPATIENT
Start: 2024-02-07 | End: 2024-02-07 | Stop reason: SDUPTHER

## 2024-02-07 RX ORDER — CARBOPROST TROMETHAMINE 250 UG/ML
250 INJECTION, SOLUTION INTRAMUSCULAR PRN
Status: CANCELLED | OUTPATIENT
Start: 2024-02-07

## 2024-02-07 RX ORDER — ACETAMINOPHEN 500 MG
1000 TABLET ORAL EVERY 6 HOURS SCHEDULED
Status: CANCELLED | OUTPATIENT
Start: 2024-02-08

## 2024-02-07 RX ORDER — FENTANYL CITRATE 50 UG/ML
INJECTION, SOLUTION INTRAMUSCULAR; INTRAVENOUS
Status: DISCONTINUED
Start: 2024-02-07 | End: 2024-02-07

## 2024-02-07 RX ORDER — OXYCODONE HYDROCHLORIDE 5 MG/1
10 TABLET ORAL EVERY 4 HOURS PRN
Status: CANCELLED | OUTPATIENT
Start: 2024-02-08

## 2024-02-07 RX ORDER — SWAB
1 SWAB, NON-MEDICATED MISCELLANEOUS DAILY
Status: DISCONTINUED | OUTPATIENT
Start: 2024-02-07 | End: 2024-02-09 | Stop reason: HOSPADM

## 2024-02-07 RX ORDER — DIPHENHYDRAMINE HYDROCHLORIDE 50 MG/ML
25 INJECTION INTRAMUSCULAR; INTRAVENOUS EVERY 6 HOURS PRN
Status: DISCONTINUED | OUTPATIENT
Start: 2024-02-07 | End: 2024-02-09 | Stop reason: HOSPADM

## 2024-02-07 RX ORDER — KETOROLAC TROMETHAMINE 30 MG/ML
INJECTION, SOLUTION INTRAMUSCULAR; INTRAVENOUS PRN
Status: DISCONTINUED | OUTPATIENT
Start: 2024-02-07 | End: 2024-02-07 | Stop reason: SDUPTHER

## 2024-02-07 RX ORDER — POLYETHYLENE GLYCOL 3350 17 G/17G
17 POWDER, FOR SOLUTION ORAL DAILY PRN
Status: DISCONTINUED | OUTPATIENT
Start: 2024-02-07 | End: 2024-02-09 | Stop reason: HOSPADM

## 2024-02-07 RX ORDER — SODIUM CHLORIDE 0.9 % (FLUSH) 0.9 %
5-40 SYRINGE (ML) INJECTION PRN
Status: DISCONTINUED | OUTPATIENT
Start: 2024-02-07 | End: 2024-02-09 | Stop reason: HOSPADM

## 2024-02-07 RX ORDER — METHYLERGONOVINE MALEATE 0.2 MG/ML
200 INJECTION INTRAVENOUS PRN
Status: CANCELLED | OUTPATIENT
Start: 2024-02-07

## 2024-02-07 RX ORDER — ACETAMINOPHEN 325 MG/1
650 TABLET ORAL EVERY 6 HOURS
Status: DISCONTINUED | OUTPATIENT
Start: 2024-02-07 | End: 2024-02-07 | Stop reason: SDUPTHER

## 2024-02-07 RX ORDER — BUPIVACAINE HYDROCHLORIDE 2.5 MG/ML
INJECTION, SOLUTION EPIDURAL; INFILTRATION; INTRACAUDAL
Status: COMPLETED
Start: 2024-02-07 | End: 2024-02-07

## 2024-02-07 RX ORDER — MORPHINE SULFATE 1 MG/ML
INJECTION, SOLUTION EPIDURAL; INTRATHECAL; INTRAVENOUS PRN
Status: DISCONTINUED | OUTPATIENT
Start: 2024-02-07 | End: 2024-02-07 | Stop reason: SDUPTHER

## 2024-02-07 RX ORDER — IBUPROFEN 400 MG/1
800 TABLET ORAL EVERY 8 HOURS SCHEDULED
Status: DISCONTINUED | OUTPATIENT
Start: 2024-02-07 | End: 2024-02-09 | Stop reason: HOSPADM

## 2024-02-07 RX ORDER — DIPHENHYDRAMINE HYDROCHLORIDE 50 MG/ML
12.5 INJECTION INTRAMUSCULAR; INTRAVENOUS EVERY 4 HOURS PRN
Status: DISCONTINUED | OUTPATIENT
Start: 2024-02-07 | End: 2024-02-07 | Stop reason: SDUPTHER

## 2024-02-07 RX ORDER — IBUPROFEN 400 MG/1
800 TABLET ORAL EVERY 8 HOURS SCHEDULED
Status: CANCELLED | OUTPATIENT
Start: 2024-02-07

## 2024-02-07 RX ORDER — SODIUM CHLORIDE, SODIUM LACTATE, POTASSIUM CHLORIDE, CALCIUM CHLORIDE 600; 310; 30; 20 MG/100ML; MG/100ML; MG/100ML; MG/100ML
INJECTION, SOLUTION INTRAVENOUS CONTINUOUS
Status: CANCELLED | OUTPATIENT
Start: 2024-02-07 | End: 2024-02-08

## 2024-02-07 RX ORDER — SIMETHICONE 80 MG
80 TABLET,CHEWABLE ORAL EVERY 6 HOURS PRN
Status: CANCELLED | OUTPATIENT
Start: 2024-02-07

## 2024-02-07 RX ORDER — POLYETHYLENE GLYCOL 3350 17 G/17G
17 POWDER, FOR SOLUTION ORAL DAILY PRN
Status: CANCELLED | OUTPATIENT
Start: 2024-02-07

## 2024-02-07 RX ORDER — ONDANSETRON 4 MG/1
4 TABLET, ORALLY DISINTEGRATING ORAL EVERY 8 HOURS PRN
Status: CANCELLED | OUTPATIENT
Start: 2024-02-07

## 2024-02-07 RX ORDER — FENTANYL CITRATE 50 UG/ML
INJECTION, SOLUTION INTRAMUSCULAR; INTRAVENOUS PRN
Status: DISCONTINUED | OUTPATIENT
Start: 2024-02-07 | End: 2024-02-07 | Stop reason: SDUPTHER

## 2024-02-07 RX ORDER — NALOXONE HYDROCHLORIDE 0.4 MG/ML
INJECTION, SOLUTION INTRAMUSCULAR; INTRAVENOUS; SUBCUTANEOUS PRN
Status: ACTIVE | OUTPATIENT
Start: 2024-02-07 | End: 2024-02-08

## 2024-02-07 RX ORDER — SODIUM CHLORIDE 0.9 % (FLUSH) 0.9 %
5-40 SYRINGE (ML) INJECTION EVERY 12 HOURS SCHEDULED
Status: CANCELLED | OUTPATIENT
Start: 2024-02-07

## 2024-02-07 RX ORDER — ONDANSETRON 2 MG/ML
4 INJECTION INTRAMUSCULAR; INTRAVENOUS EVERY 6 HOURS PRN
Status: DISCONTINUED | OUTPATIENT
Start: 2024-02-07 | End: 2024-02-07 | Stop reason: SDUPTHER

## 2024-02-07 RX ORDER — FAMOTIDINE 20 MG/1
20 TABLET, FILM COATED ORAL 2 TIMES DAILY PRN
Status: DISCONTINUED | OUTPATIENT
Start: 2024-02-07 | End: 2024-02-09 | Stop reason: HOSPADM

## 2024-02-07 RX ORDER — SODIUM CHLORIDE 9 MG/ML
INJECTION, SOLUTION INTRAVENOUS PRN
Status: CANCELLED | OUTPATIENT
Start: 2024-02-07

## 2024-02-07 RX ORDER — MISOPROSTOL 200 UG/1
800 TABLET ORAL PRN
Status: CANCELLED | OUTPATIENT
Start: 2024-02-07

## 2024-02-07 RX ORDER — ONDANSETRON 2 MG/ML
4 INJECTION INTRAMUSCULAR; INTRAVENOUS EVERY 6 HOURS PRN
Status: DISCONTINUED | OUTPATIENT
Start: 2024-02-07 | End: 2024-02-09 | Stop reason: HOSPADM

## 2024-02-07 RX ORDER — CARBOPROST TROMETHAMINE 250 UG/ML
250 INJECTION, SOLUTION INTRAMUSCULAR PRN
Status: DISCONTINUED | OUTPATIENT
Start: 2024-02-07 | End: 2024-02-09 | Stop reason: HOSPADM

## 2024-02-07 RX ORDER — ONDANSETRON 2 MG/ML
4 INJECTION INTRAMUSCULAR; INTRAVENOUS EVERY 6 HOURS PRN
Status: CANCELLED | OUTPATIENT
Start: 2024-02-07

## 2024-02-07 RX ORDER — SODIUM CHLORIDE 0.9 % (FLUSH) 0.9 %
5-40 SYRINGE (ML) INJECTION EVERY 12 HOURS SCHEDULED
Status: DISCONTINUED | OUTPATIENT
Start: 2024-02-07 | End: 2024-02-09 | Stop reason: HOSPADM

## 2024-02-07 RX ORDER — CHLOROPROCAINE HYDROCHLORIDE 30 MG/ML
INJECTION, SOLUTION EPIDURAL; INFILTRATION; INTRACAUDAL; PERINEURAL PRN
Status: DISCONTINUED | OUTPATIENT
Start: 2024-02-07 | End: 2024-02-07 | Stop reason: SDUPTHER

## 2024-02-07 RX ORDER — DOCUSATE SODIUM 100 MG/1
100 CAPSULE, LIQUID FILLED ORAL 2 TIMES DAILY PRN
Status: CANCELLED | OUTPATIENT
Start: 2024-02-07

## 2024-02-07 RX ORDER — SIMETHICONE 80 MG
80 TABLET,CHEWABLE ORAL EVERY 6 HOURS PRN
Status: DISCONTINUED | OUTPATIENT
Start: 2024-02-07 | End: 2024-02-09 | Stop reason: HOSPADM

## 2024-02-07 RX ADMIN — FENTANYL CITRATE 100 MCG: 50 INJECTION, SOLUTION INTRAMUSCULAR; INTRAVENOUS at 07:32

## 2024-02-07 RX ADMIN — BUPIVACAINE HYDROCHLORIDE 5 ML: 2.5 INJECTION, SOLUTION EPIDURAL; INFILTRATION; INTRACAUDAL; PERINEURAL at 07:30

## 2024-02-07 RX ADMIN — LIDOCAINE HYDROCHLORIDE AND EPINEPHRINE 5 ML: 20; 5 INJECTION, SOLUTION EPIDURAL; INFILTRATION; INTRACAUDAL; PERINEURAL at 14:04

## 2024-02-07 RX ADMIN — CHLOROPROCAINE HYDROCHLORIDE 5 ML: 30 INJECTION, SOLUTION EPIDURAL; INFILTRATION; INTRACAUDAL; PERINEURAL at 15:47

## 2024-02-07 RX ADMIN — LIDOCAINE HYDROCHLORIDE AND EPINEPHRINE 3 ML: 20; 5 INJECTION, SOLUTION EPIDURAL; INFILTRATION; INTRACAUDAL; PERINEURAL at 16:35

## 2024-02-07 RX ADMIN — LIDOCAINE HYDROCHLORIDE AND EPINEPHRINE 4 ML: 20; 5 INJECTION, SOLUTION EPIDURAL; INFILTRATION; INTRACAUDAL; PERINEURAL at 16:30

## 2024-02-07 RX ADMIN — EPHEDRINE SULFATE 10 MG: 50 INJECTION INTRAVENOUS at 14:25

## 2024-02-07 RX ADMIN — Medication 1 MILLI-UNITS/MIN: at 00:00

## 2024-02-07 RX ADMIN — LIDOCAINE HYDROCHLORIDE AND EPINEPHRINE 3 ML: 20; 5 INJECTION, SOLUTION EPIDURAL; INFILTRATION; INTRACAUDAL; PERINEURAL at 16:10

## 2024-02-07 RX ADMIN — AZITHROMYCIN DIHYDRATE 500 MG: 500 INJECTION, POWDER, LYOPHILIZED, FOR SOLUTION INTRAVENOUS at 15:10

## 2024-02-07 RX ADMIN — CHLOROPROCAINE HYDROCHLORIDE 5 ML: 30 INJECTION, SOLUTION EPIDURAL; INFILTRATION; INTRACAUDAL; PERINEURAL at 15:43

## 2024-02-07 RX ADMIN — LIDOCAINE HYDROCHLORIDE AND EPINEPHRINE 10 ML: 20; 5 INJECTION, SOLUTION EPIDURAL; INFILTRATION; INTRACAUDAL; PERINEURAL at 14:02

## 2024-02-07 RX ADMIN — FENTANYL CITRATE 10 ML/HR: 0.05 INJECTION, SOLUTION INTRAMUSCULAR; INTRAVENOUS at 12:09

## 2024-02-07 RX ADMIN — CHLOROPROCAINE HYDROCHLORIDE 5 ML: 30 INJECTION, SOLUTION EPIDURAL; INFILTRATION; INTRACAUDAL; PERINEURAL at 15:45

## 2024-02-07 RX ADMIN — SODIUM CHLORIDE, POTASSIUM CHLORIDE, SODIUM LACTATE AND CALCIUM CHLORIDE: 600; 310; 30; 20 INJECTION, SOLUTION INTRAVENOUS at 21:57

## 2024-02-07 RX ADMIN — MORPHINE SULFATE 3 MG: 1 INJECTION EPIDURAL; INTRATHECAL; INTRAVENOUS at 16:32

## 2024-02-07 RX ADMIN — SODIUM CHLORIDE, POTASSIUM CHLORIDE, SODIUM LACTATE AND CALCIUM CHLORIDE: 600; 310; 30; 20 INJECTION, SOLUTION INTRAVENOUS at 18:59

## 2024-02-07 RX ADMIN — EPHEDRINE SULFATE 10 MG: 50 INJECTION INTRAVENOUS at 14:32

## 2024-02-07 RX ADMIN — SODIUM CHLORIDE, POTASSIUM CHLORIDE, SODIUM LACTATE AND CALCIUM CHLORIDE: 600; 310; 30; 20 INJECTION, SOLUTION INTRAVENOUS at 15:34

## 2024-02-07 RX ADMIN — HYDROMORPHONE HYDROCHLORIDE 0.25 MG: 1 INJECTION, SOLUTION INTRAMUSCULAR; INTRAVENOUS; SUBCUTANEOUS at 18:56

## 2024-02-07 RX ADMIN — SODIUM CHLORIDE, POTASSIUM CHLORIDE, SODIUM LACTATE AND CALCIUM CHLORIDE: 600; 310; 30; 20 INJECTION, SOLUTION INTRAVENOUS at 14:50

## 2024-02-07 RX ADMIN — WATER 2000 MG: 1 INJECTION INTRAMUSCULAR; INTRAVENOUS; SUBCUTANEOUS at 14:47

## 2024-02-07 RX ADMIN — ONDANSETRON 4 MG: 2 INJECTION INTRAMUSCULAR; INTRAVENOUS at 21:43

## 2024-02-07 RX ADMIN — Medication 22 MILLI-UNITS/MIN: at 08:40

## 2024-02-07 RX ADMIN — KETOROLAC TROMETHAMINE 30 MG: 30 INJECTION, SOLUTION INTRAMUSCULAR; INTRAVENOUS at 16:55

## 2024-02-07 RX ADMIN — FENTANYL CITRATE 100 MCG: 50 INJECTION, SOLUTION INTRAMUSCULAR; INTRAVENOUS at 14:47

## 2024-02-07 RX ADMIN — FENTANYL CITRATE 10 ML/HR: 0.05 INJECTION, SOLUTION INTRAMUSCULAR; INTRAVENOUS at 05:35

## 2024-02-07 RX ADMIN — FENTANYL CITRATE 100 MCG: 50 INJECTION, SOLUTION INTRAMUSCULAR; INTRAVENOUS at 16:35

## 2024-02-07 RX ADMIN — LIDOCAINE HYDROCHLORIDE AND EPINEPHRINE 5 ML: 20; 5 INJECTION, SOLUTION EPIDURAL; INFILTRATION; INTRACAUDAL; PERINEURAL at 15:39

## 2024-02-07 RX ADMIN — ONDANSETRON HYDROCHLORIDE 4 MG: 2 INJECTION, SOLUTION INTRAMUSCULAR; INTRAVENOUS at 16:17

## 2024-02-07 ASSESSMENT — PAIN DESCRIPTION - ORIENTATION: ORIENTATION: LOWER

## 2024-02-07 ASSESSMENT — PAIN DESCRIPTION - DESCRIPTORS: DESCRIPTORS: CRAMPING

## 2024-02-07 ASSESSMENT — PAIN DESCRIPTION - LOCATION: LOCATION: ABDOMEN

## 2024-02-07 NOTE — OP NOTE
Operative Note  Patient - Beatrice Soto  Medical Record Number - [id number]   YOB: 1990      DATE AND TIME OF PROCEDURE: [unfilled]   6:44 PM     PREOPERATIVE DIAGNOSIS:   1. Intrauterine pregnancy at 40w1d  2. Failed IOL  3. IVF pregnancy    POSTOPERATIVE DIAGNOSIS:   1. S/p pLTCS of viable male infant at 40w1d  2-3. same    PROCEDURE(S): Procedure(s):   SECTION     ANESTHESIA: Spinal    SURGEON:  Chanda Driver DO    ASSISTANT: None     QUANTITATIVE BLOOD LOSS AT PROCEDURE END: pending,  mL    COMPLICATIONS: none    IMPLANTS: *No implants in the log*    SPECIMENS: none    FINDINGS: Viable male infant delivered, weighing 3.435 kg (7 lb 9.2 oz) . Normal uterus and tubes. Polycystic appearance of bilateral ovaries, otherwise normal appearing.     PROPHYLACTIC ANTIBIOTICS: Ancef and azithromycin    DVT PROPHYLAXIS: Sequential Compression Devices         Fetal Description: ervin     Birth Information:   Information for the patient's :  Dhruv Soto [[id number]]   @[phone]@     Umbilical Cord: Nuchal Cord x  1    Placenta:  manual removal        Procedure Detail:      After proper patient identification and consent, the patient was taken to the operating room, where epidural anesthesia was administered and found to be adequate. Perez catheter had been placed using sterile technique. She was prepped and draped in the normal sterile fashion in the dorsal supine position with leftward tilt. All pressure points were padded and warm blankets were used to maintain control of core body temperature.    The abdomen was entered using the Pfannenstiel technique. The peritoneum was identified and entered bluntly well superior to the bladder without any apparent injury. The bladder blade was inserted, vesicouterine peritoneum was identified, and bladder flap created using Metzenbaum scissors. A low transverse uterine incision was made with the scalpel and  extended with blunt finger dissection. Amniotomy was performed and the fluid was medium amount clear.  The bladder blade was removed and the infant was delivered atraumatically using the usual maneuvers by insertion of the surgeon's hand to elevate the fetal head with assistance from fundal pressure. Nuchal x1 noted, easily reduced. The remainder of the infant was delivered, the mouth and nares bulb suctioned, the cord clamped and cut, and the baby was handed off to nursing staff in attendance. Placenta was then removed from the uterus. The uterus was exteriorized and curettaged with a moist lap pad and cleared of all clots and debris. The uterine incision was closed with  0-vicryl in a running locked fashion. Good uterine tone was noted.  A second horizontal imbricating layer of 0-monocryl was placed and good hemostasis observed. The abdomen was then cleared of blood clots. The uterus, tubes, and ovaries were then returned to the abdomen and pelvis. The uterine incision was reinspected and found to be hemostatic.    The subfascial spaces were inspected and noted to be hemostatic. The fascia was then reapproximated with 1 Vicryl in running fashion, occasionally running a finger below the closure to ensure underlying structures were not incorporated into the closure. The subcutaneous tissue was irrigated with normal saline. The skin was closed with 3-0 monocryl in subcuticular stitch. Good hemostasis noted. Aquacel and Tegrederm applied over incision.     The patient tolerated the procedure well. Sponge, instrument, and needle counts were correct x 2 and the patient was taken to the recovery room in good and stable condition    Electronically Signed By:   Chanda Driver DO   2/7/2024 6:44 PM

## 2024-02-07 NOTE — L&D DELIVERY NOTE
Dhruv Soto Maiadontrell [[id number]]      Labor Events     Labor: No   Steroids: None  Cervical Ripening Date/Time:  24 19:17:00   Cervical Ripening Type: Perez/EASI, Misoprostol  Rupture Date/Time:  24 11:52:00   Rupture Type: AROM, Intact  Fluid Color: Clear  Fluid Odor: None  Fluid Volume: Moderate  Augmentation: AROM  Labor Complications: Dysfunctional Labor   Additional Complications:  OB: DELIVERY - COMPLICATIONS   Failed induction of labor             Anesthesia    Method: Epidural       Labor Event Times      Labor onset date/time:  24 12:00:00 EST     Dilation complete date/time:        Start pushing date/time:     Decision date/time (emergent ):            Delivery Details      Delivery Date: 24 Delivery Time: 16:12:00   Delivery Type: , Low Transverse  Trial of Labor?: Yes   Categorization: Primary   Priority: unscheduled  Indications for : Failure to Progress   Other  Indications:  PROCEDURAL - OBSTETRIC - DELIVERY -  SECTION - INDICATIONS FOR  SECTION   Failed induction of labor      Skin Incision Type: Pfannenstiel  Uterine Incision: Low Transverse        Presentation    Presentation: Vertex       Shoulder Dystocia    Shoulder Dystocia Present?: No       Assisted Delivery Details    Forceps Attempted?: No  Vacuum Extractor Attempted?: No                           Cord    Vessels: 3 Vessels  Complications: Nuchal Loose  Delayed Cord Clamping?: Yes  Cord Blood Disposition: Discard  Gases Sent?: No              Placenta    Date/Time: 2024 16:13:00  Removal: Manual Removal  Appearance: Intact  Disposition: Discarded       Lacerations    Episiotomy: None  Perineal Lacerations: None  Other Lacerations: no non-perineal laceration       Blood Loss  Mother: Beatrice Soto #[id number]     Start of Mother's Information      Delivery Blood Loss  24 1200 - 24 1700      None                 End

## 2024-02-07 NOTE — PROGRESS NOTES
BRIEF NOTE    Presented to patient room for recheck. She reports feeling more discomfort with contractions. Cervix unchanged. Discussed that she has now met criteria for failed IOL so recommendation to move forward with primary .     R/B/A of primary low transverse  section discussed with pt. Risks include but are not limited to pain, bleeding, infection, injury to surrounding organs and VTE postop/postpartum. Discussed possible need for subsequent surgery to repair injury if encountered; discussed that any hx of abd surgery puts her at higher risk for abnormal anatomy resulting in injury. Discussed possibility of injury to infant. If necessary, pt accepts blood products to save her life. Pt and family voiced understanding and elects to proceed with pLTCS. All questions answered.    - Pit turned off  - 2g ancef and 500 mg azithromycin prior to procedure.      Maternal VSS and category 1 tracing. Will let anesthesia know and proceed with anesthesia/OR is ready.     Electronically signed:  Chanda Driver DO  2024 1:54 PM

## 2024-02-07 NOTE — PROGRESS NOTES
LABOR & DELIVERY PROGRESS NOTE    Subjective    HPI: Patient is doing well after epidural bolus. Voices no complaints at this time.    Review of Systems   Negative except those mentioned in HPI.     Objective    Physical Exam  /61   Pulse (!) 106   Temp 98.2 °F (36.8 °C) (Oral)   Resp 18   Ht 1.6 m (5' 3\")   Wt 71.2 kg (157 lb)   LMP 2023   SpO2 98%   BMI 27.81 kg/m²   Gen: AOx3, NAD  CV: Per monitoring, mildly tachycardic  Pulm: Symmetrical non labored respiration  Ext: Trace edema bilateral ankles/feet.    Cervical Exam:   Deferred, last checked at 0700 by midwife /2, IUPC was placed at that time.    EFM:  Baseline : 125 bpm  Variability: moderate  Accels: present  Decels: none  [place]: ctx q3-5 mins, MVUs 180-195      Assessment and Plan  Beatrice Soto is a 33 y.o.  w/ SIUP at 40w1d admitted for scheduled IOL 2/2 IVF perg. Pregnancy complicated by AMA/APA, and breech resolved with ECV. Few elevated BP readings during admission.     1. Labor  - CE: deferred, last checked at 0700 by midwife 2, IUPC was placed at that time. Minimally changed from prior.   - S/p CB and cytotec for ripening. AROM @ 1130 on . Pit break last night. Pit at 22 mU/min. MVUs almost adequate. Will increase to max of 25 mU/min per protocol.  - Continuous fetal monitoring.  - Comfortable s/p epidural bolus  - GBS neg  - Fetal status: Category 1 tracing  - Maternal status: VS WNL except mild tachycardia  - Anticipate .    2. Elev BP   - Mild range readings . BP normal since.   - PIH labs wnl except p/c ratio 0.6.       Electronically signed by:  Chanda Driver DO  2024 8:31 AM

## 2024-02-07 NOTE — PROGRESS NOTES
LABOR & DELIVERY PROGRESS NOTE    Subjective    HPI: Patient is doing well. Has some pain with contractions but the epidural boluses help.     Review of Systems   Negative except those mentioned in HPI.     Objective    Physical Exam  /78   Pulse 100   Temp 97.9 °F (36.6 °C) (Oral)   Resp 18   Ht 1.6 m (5' 3\")   Wt 71.2 kg (157 lb)   LMP 2023   SpO2 98%   BMI 27.81 kg/m²   Gen: AOx3, NAD  CV: Per monitoring, tachycardic  Pulm: Symmetrical non labored respiration  Ext: Trace edema bilateral ankles/feet.    Cervical Exam:       EFM:  Baseline : 125 bpm  Variability: moderate  Accels: present  Decels: none  [place]: ctx q3-5 mins, MVUs <200      Assessment and Plan  Beatrice Soto is a 33 y.o.  w/ SIUP at 40w1d admitted for scheduled IOL /2 IVF perg. Pregnancy complicated by AMA/APA, and breech resolved with ECV. Few elevated BP readings during admission.     1. Labor  - S/p CB and cytotec for ripening. AROM @ 1130 on . Pit break last night. Pit at 25 mU/min. MVUs almost adequate.   - CE: -2, unchanged from prior (and minimally changed since 12PM yesterday). Discussed with patient and family. She has been on pitocin for >24h (minus the 1hr pit break last night), ruptured for just about 24h, and on max level of pitocin with inadequate ctx. Concern for failed induction. She would like to give 2-3h. If no change at that point, I will recommend proceeding with pLTCS for failed induction. They verbalized understanding and agreement with plan.   - Continuous fetal monitoring.  - Comfortable s/p epidural bolus  - GBS neg  - Fetal status: Category 1 tracing  - Maternal status: VS WNL except mild tachycardia  - Anticipate .    2. Elev BP   - Mild range readings . BP normal since.   - PIH labs wnl except p/c ratio 0.6.       Electronically signed by:  Chanda Driver DO  2024 11:22 AM

## 2024-02-07 NOTE — PROGRESS NOTES
Labor Progress Note    S: Called by RN who reports patient more uncomfortable and feeling vaginal/rectal pressure that is intermittent. Patient seen, fetal heart rate and contraction pattern evaluated. Appears much more uncomfortable. Just pushed epidural button with RN.     Physical Exam:  Patient Vitals for the past 4 hrs:   Temp Pulse Resp BP SpO2   24 0539 98.1 °F (36.7 °C) (!) 104 16 119/68 --   24 0450 98.1 °F (36.7 °C) (!) 104 16 129/61 98 %   24 0340 98.3 °F (36.8 °C) (!) 109 16 (!) 104/58 95 %         Cervical Exam: /-2  Membranes:  clear  Uterine Contractions:  Frequency: Irregular q2.5-5 minutes, strong  Fetal Heart Rate: 125s, acccels present, decels absent, moderate variability (RN currently switching from Lupe to external US)      Assessment/Plan:    33 y.o.  at 40w1d IUP  IOL IVF s/p maty diamond, max pit x1, now back to 20mu pit  Cat 1 tracing  GBS negative  AMA    P:  Reviewed SVE unchanged with patient  Reviewed IUPC r/b/a and patient consents to placement   Reviewed indications for c/s  Ok to increase pit up to 25mu  RN calling anesthesia to re-dose epidural  Recheck 4 hours after adequate contractions and reviewed consider c/s if unchanged  All questions asked/answered and patient agrees with plan    ERIKA Alexander CNM

## 2024-02-07 NOTE — ANESTHESIA POSTPROCEDURE EVALUATION
Department of Anesthesiology  Postprocedure Note    Patient: Beatrice Soto  MRN: [id number]  YOB: 1990  Date of evaluation: 2024    Procedure Summary       Date: 24 Room / Location: [facility] L&D OR    Anesthesia Start: 1302 Anesthesia Stop: 24 1702    Procedures:        SECTION      Labor Analgesia Diagnosis: (Failure to Progress)    Surgeons: Chanda Driver DO Responsible Provider: Adenike Mott DO    Anesthesia Type: Epidural ASA Status: 2            Anesthesia Type: Epidural    Ronald Phase I:      Ronald Phase II:      Anesthesia Post Evaluation    Patient location during evaluation: bedside  Patient participation: complete - patient participated  Level of consciousness: awake and alert  Pain score: 0  Airway patency: patent  Nausea & Vomiting: no nausea and no vomiting  Cardiovascular status: hemodynamically stable  Respiratory status: acceptable  Hydration status: euvolemic  Pain management: adequate    No notable events documented.

## 2024-02-07 NOTE — PROGRESS NOTES
0730 Bedside and Verbal shift change report given to ANIKA Ayala RN (oncoming nurse) by JOSE MIGUEL Mahajan RN (offgoing nurse). Report included the following information Nurse Handoff Report, Recent Results, and Event Log.   1115 Dr rDiver in to evaluate patient. SVE by her . No change. Will recheck cervix in 2-3 hours. If no change will consider  delivery. Pt has good movement in legs and mild pain with contractions. Has been using PCEA dosing  1200 Right and Left lateral release complete  1238 Pt positioned in \"flying cow girl trendelenburg\"  1315 Out of trendelenburg and flying cow girl. To right side HOB elevated  1345 Dr Driver in to evaluate patient. SVE by her. No change. Recommends  section due to failed induction. Pt asking question and procedure explained by Dr Driver.  1348 C Section called. Pitocin and epidural off.  1350 Dr Morgan notified that C Section was called. Also notified that pt's epidural is questionable. Pt has no motor block/level and that she is redosing frequently (several times an hour) Will be up to assess  1356 Dr Morgan in to evaluate patient. Plan to replace epidural. First epidural removed by Dr Morgan  1407 Epidural #2 placed by Dr Morgan  1415 Dr Driver is attending to a vaginal delivery at the moment. Will move back to OR when she is finished  1425 Pt is hypotensive and nauseated. Ephedrine 10mg IV  1433 Ephedrine 10 mg repeated IV per Dr Morgan instructions. Aware of blood pressures and pulse. IV bolus continues  1440 Ephedrine 25 mg IM given per Dr Morgan instruction. (Dr Morgan at bedside) Still waiting for OK to go back.   1510 Zithromax 500 mg IV  1524 DR Driver now available. Anesthesia notified  and that her epidural dose seems to be wearing off.  Monitor off. To OR # 1 for  section  1707 Returned to LDR # 10 for recovery  1856 Dilaudid 0.25 mg given for abd pain.  1900 Family in visiting.   191 Pericare done. Epidural removed. Black tip intact. Perez

## 2024-02-07 NOTE — PROGRESS NOTES
Bedside and Verbal shift change report given to ZAK Mahajan RN (oncoming nurse) by MAGGIE Oliveira RN (offgoing nurse). Report included the following information Nurse Handoff Report.     2043- S. Babni CNM at bedside, SVE 5/80/-2. Discussing pitocin break.    2050- Pitocin stopped.    0000- Pitocin restarted.    0601- Reached 20 units of pitocin.    0603- Called S. Babin CNM with update on pt status, no new orders.    0629- Pt feeling increased pain and pressure in vaginal and rectum, called S. Babin CNM with update.    0645- S. Babin CNM at bedside, SVE 5/80/-2, IUPC placed and external fetal monitor placed, orders received to titrate pitocin to 25 units.    0730- Bedside and Verbal shift change report given to GUILLERMO Ayala RN (oncoming nurse) by ZAK Mahajan RN (offgoing nurse). Report included the following information Nurse Handoff Report.

## 2024-02-07 NOTE — PROGRESS NOTES
Labor Progress Note    S: Patient seen, fetal heart rate and contraction pattern evaluated. Comfortable with epidural    Physical Exam:  Patient Vitals for the past 4 hrs:   Temp Pulse Resp BP SpO2   24 98.4 °F (36.9 °C) (!) 123 16 136/62 --   24 204 98.4 °F (36.9 °C) (!) 120 16 136/74 --   24 1940 98.3 °F (36.8 °C) (!) 124 16 (!) 111/47 99 %   24 1908 -- (!) 108 -- 134/66 --   24 1839 98.2 °F (36.8 °C) (!) 116 16 128/60 98 %   24 1739 97.9 °F (36.6 °C) (!) 108 16 135/69 99 %         Cervical Exam: 4-5/80/-2  Membranes:  clear  Uterine Contractions:  Frequency: Irregular q1.5-5 minutes, mild to moderate  Fetal Heart Rate: 140s, accels present, decels absent, moderate variability      Assessment/Plan:    33 y.o.  at 40w0d IUP  IOL IVF s/p cook, cytotec, max pit x1  Cat 1 tracing  GBS negative  AMA    P:  Assumed care of patient  Introduced self to patient/partner  Reviewed SVE with patient/partner and reviewed possible pitocin break- patient agreeable to this  Will do pit break and tums  Restart pit at midnight and titrate per protocol  Recheck 4-6 hours after regular pattern achieved, prn maternal/fetal indication  All questions asked/answered and patient/partner agree with plan    ERIKA Alexander CNM

## 2024-02-08 LAB
ERYTHROCYTE [DISTWIDTH] IN BLOOD BY AUTOMATED COUNT: 13.7 % (ref 11.5–14.5)
HCT VFR BLD AUTO: 29.6 % (ref 35–47)
HGB BLD-MCNC: 10.4 G/DL (ref 11.5–16)
MCH RBC QN AUTO: 30.7 PG (ref 26–34)
MCHC RBC AUTO-ENTMCNC: 35.1 G/DL (ref 30–36.5)
MCV RBC AUTO: 87.3 FL (ref 80–99)
NRBC # BLD: 0 K/UL (ref 0–0.01)
NRBC BLD-RTO: 0 PER 100 WBC
PLATELET # BLD AUTO: 206 K/UL (ref 150–400)
PMV BLD AUTO: 10.8 FL (ref 8.9–12.9)
RBC # BLD AUTO: 3.39 M/UL (ref 3.8–5.2)
WBC # BLD AUTO: 10 K/UL (ref 3.6–11)

## 2024-02-08 PROCEDURE — 85027 COMPLETE CBC AUTOMATED: CPT

## 2024-02-08 PROCEDURE — 6360000002 HC RX W HCPCS: Performed by: STUDENT IN AN ORGANIZED HEALTH CARE EDUCATION/TRAINING PROGRAM

## 2024-02-08 PROCEDURE — 1120000000 HC RM PRIVATE OB

## 2024-02-08 PROCEDURE — 6360000002 HC RX W HCPCS: Performed by: ANESTHESIOLOGY

## 2024-02-08 PROCEDURE — 36415 COLL VENOUS BLD VENIPUNCTURE: CPT

## 2024-02-08 PROCEDURE — 6370000000 HC RX 637 (ALT 250 FOR IP): Performed by: STUDENT IN AN ORGANIZED HEALTH CARE EDUCATION/TRAINING PROGRAM

## 2024-02-08 RX ADMIN — KETOROLAC TROMETHAMINE 30 MG: 30 INJECTION, SOLUTION INTRAMUSCULAR; INTRAVENOUS at 00:21

## 2024-02-08 RX ADMIN — HYDROMORPHONE HYDROCHLORIDE 0.25 MG: 1 INJECTION, SOLUTION INTRAMUSCULAR; INTRAVENOUS; SUBCUTANEOUS at 05:41

## 2024-02-08 RX ADMIN — KETOROLAC TROMETHAMINE 30 MG: 30 INJECTION, SOLUTION INTRAMUSCULAR; INTRAVENOUS at 07:13

## 2024-02-08 RX ADMIN — ONDANSETRON 4 MG: 2 INJECTION INTRAMUSCULAR; INTRAVENOUS at 03:50

## 2024-02-08 RX ADMIN — IBUPROFEN 800 MG: 400 TABLET, FILM COATED ORAL at 13:42

## 2024-02-08 RX ADMIN — OXYCODONE 10 MG: 5 TABLET ORAL at 21:21

## 2024-02-08 RX ADMIN — ACETAMINOPHEN 1000 MG: 500 TABLET ORAL at 03:50

## 2024-02-08 RX ADMIN — IBUPROFEN 800 MG: 400 TABLET, FILM COATED ORAL at 21:05

## 2024-02-08 RX ADMIN — DOCUSATE SODIUM 100 MG: 100 CAPSULE, LIQUID FILLED ORAL at 21:05

## 2024-02-08 RX ADMIN — ACETAMINOPHEN 1000 MG: 500 TABLET ORAL at 12:08

## 2024-02-08 RX ADMIN — OXYCODONE 10 MG: 5 TABLET ORAL at 16:14

## 2024-02-08 RX ADMIN — ACETAMINOPHEN 1000 MG: 500 TABLET ORAL at 18:37

## 2024-02-08 RX ADMIN — SIMETHICONE 80 MG: 80 TABLET, CHEWABLE ORAL at 21:05

## 2024-02-08 ASSESSMENT — PAIN SCALES - GENERAL
PAINLEVEL_OUTOF10: 9
PAINLEVEL_OUTOF10: 8
PAINLEVEL_OUTOF10: 8
PAINLEVEL_OUTOF10: 9
PAINLEVEL_OUTOF10: 6
PAINLEVEL_OUTOF10: 8
PAINLEVEL_OUTOF10: 8
PAINLEVEL_OUTOF10: 3

## 2024-02-08 ASSESSMENT — PAIN - FUNCTIONAL ASSESSMENT
PAIN_FUNCTIONAL_ASSESSMENT: ACTIVITIES ARE NOT PREVENTED

## 2024-02-08 ASSESSMENT — PAIN DESCRIPTION - DESCRIPTORS
DESCRIPTORS: SORE
DESCRIPTORS: ACHING
DESCRIPTORS: SORE
DESCRIPTORS: TIGHTNESS;SORE

## 2024-02-08 ASSESSMENT — PAIN DESCRIPTION - LOCATION
LOCATION: ABDOMEN
LOCATION: ABDOMEN;INCISION
LOCATION: INCISION
LOCATION: ABDOMEN
LOCATION: INCISION
LOCATION: ABDOMEN
LOCATION: INCISION

## 2024-02-08 ASSESSMENT — PAIN DESCRIPTION - ORIENTATION
ORIENTATION: LOWER

## 2024-02-08 NOTE — LACTATION NOTE
This note was copied from a baby's chart.  Baby nursing well after delivery, deep latch obtained, mother is comfortable, baby feeding vigorously with rhythmic suck, swallow, breathe pattern, both breasts offered, baby is skin to skin for feeding.  Baby had excess spit up requiring deep suction overnight.  Mother also sent baby to nurse to be fed a bottle of formula.  Today mother would like to resume breastfeeding.  We discussed effects of early supplementation on breastfeeding success; may decrease breastmilk production and supply, increase risk for pathological engorgement, baby may develop preference for faster flow from bottles vs breast, and baby's stomach can be stretched if larger volumes of formula are given.  Infant latches easily and mother has adequate colostrum at this time.  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  Education provided with verbal, hands on teaching and handout.

## 2024-02-08 NOTE — PROGRESS NOTES
Post-Operative  Day 1    Beatrice Soto     Assessment:   Post-Op day 1, stable  Pre E w/out SF, Bps now normal  Boy circ done  Desires early d/c home today    Plan:     - Routine post-operative care.  - Plan d/c home per pt request given passing all discharge milestones  - The risks and benefits of the circumcision  procedure and anesthesia including: bleeding, infection, variability of cosmetic results were discussed at length with the mother. She is aware that future repeat procedures may be necessary. She gives informed consent to proceed as noted and her questions are answered.   - Postop hemoglobin stable.  Plan to start Fe at discharge if pt anemic.  - Ambulate today.      Information for the patient's :  Taty Gomez, Dhruv Barron [[id number]]   , Low Transverse   Patient doing well without significant complaint.  Tolerating diet.  Perez out.  Ambulating.      Vitals:  /73   Pulse (!) 113   Temp 97.7 °F (36.5 °C) (Oral)   Resp 16   Ht 1.6 m (5' 3\")   Wt 71.2 kg (157 lb)   LMP 2023   SpO2 96%   Breastfeeding Unknown   BMI 27.81 kg/m²   Temp (24hrs), Av.2 °F (36.8 °C), Min:97.7 °F (36.5 °C), Max:98.7 °F (37.1 °C)      Last 24hr Input/Output:    Intake/Output Summary (Last 24 hours) at 2024 0827  Last data filed at 2024 0350  Gross per 24 hour   Intake 800 ml   Output 4695 ml   Net -3895 ml          Exam:     Patient without distress.               Fundus firm, nontender per nursing fundal checks.  Incision bandaged, clean, dry, intact.              Perineum with normal lochia noted per nursing assessment.              Lower extremities are negative for pathological edema.    Labs:   Lab Results   Component Value Date/Time    WBC 10.0 2024 04:14 AM    WBC 5.8 2024 06:25 PM    WBC 6.1 2024 09:25 AM    HGB 10.4 2024 04:14 AM    HGB 11.7 2024 06:25 PM    HGB 11.8 2024 09:25 AM    HCT 29.6 2024 04:14 AM    HCT 35.4

## 2024-02-08 NOTE — PROGRESS NOTES
Anesthesia Post Operative Day 1      Patient is s/p spinal / epidural DuraMorph for Cesearean Section.    The patient relates mild pain, no itching and no  nausea.    All symptoms were treated with protocol meds with good results.    Patient is up and ambulating without complaints.     Plan: Continue Duramorph protocol as needed.

## 2024-02-09 VITALS
SYSTOLIC BLOOD PRESSURE: 135 MMHG | HEART RATE: 94 BPM | RESPIRATION RATE: 16 BRPM | TEMPERATURE: 97.5 F | WEIGHT: 157 LBS | BODY MASS INDEX: 27.82 KG/M2 | HEIGHT: 63 IN | DIASTOLIC BLOOD PRESSURE: 86 MMHG | OXYGEN SATURATION: 98 %

## 2024-02-09 PROBLEM — Z98.891 S/P CESAREAN SECTION: Status: ACTIVE | Noted: 2024-02-09

## 2024-02-09 PROBLEM — O14.90 PREECLAMPSIA: Status: ACTIVE | Noted: 2024-02-09

## 2024-02-09 PROCEDURE — 6370000000 HC RX 637 (ALT 250 FOR IP): Performed by: STUDENT IN AN ORGANIZED HEALTH CARE EDUCATION/TRAINING PROGRAM

## 2024-02-09 RX ORDER — PSEUDOEPHEDRINE HCL 30 MG
100 TABLET ORAL 2 TIMES DAILY PRN
Qty: 60 CAPSULE | Refills: 1 | Status: SHIPPED | OUTPATIENT
Start: 2024-02-09

## 2024-02-09 RX ORDER — OXYCODONE HYDROCHLORIDE 5 MG/1
5 TABLET ORAL EVERY 6 HOURS PRN
Qty: 18 TABLET | Refills: 0 | Status: SHIPPED | OUTPATIENT
Start: 2024-02-09 | End: 2024-02-16

## 2024-02-09 RX ORDER — IBUPROFEN 800 MG/1
800 TABLET ORAL EVERY 8 HOURS PRN
Qty: 50 TABLET | Refills: 1 | Status: SHIPPED | OUTPATIENT
Start: 2024-02-09

## 2024-02-09 RX ADMIN — ACETAMINOPHEN 1000 MG: 500 TABLET ORAL at 09:05

## 2024-02-09 RX ADMIN — OXYCODONE 10 MG: 5 TABLET ORAL at 03:38

## 2024-02-09 RX ADMIN — OXYCODONE 10 MG: 5 TABLET ORAL at 09:04

## 2024-02-09 RX ADMIN — ACETAMINOPHEN 1000 MG: 500 TABLET ORAL at 02:36

## 2024-02-09 RX ADMIN — IBUPROFEN 800 MG: 400 TABLET, FILM COATED ORAL at 05:44

## 2024-02-09 ASSESSMENT — PAIN SCALES - GENERAL
PAINLEVEL_OUTOF10: 2
PAINLEVEL_OUTOF10: 6
PAINLEVEL_OUTOF10: 10

## 2024-02-09 ASSESSMENT — PAIN DESCRIPTION - LOCATION
LOCATION: INCISION

## 2024-02-09 ASSESSMENT — PAIN - FUNCTIONAL ASSESSMENT: PAIN_FUNCTIONAL_ASSESSMENT: ACTIVITIES ARE NOT PREVENTED

## 2024-02-09 ASSESSMENT — PAIN DESCRIPTION - DESCRIPTORS
DESCRIPTORS: HEAVINESS;SORE
DESCRIPTORS: HEAVINESS;SHARP
DESCRIPTORS: SHARP

## 2024-02-09 ASSESSMENT — PAIN DESCRIPTION - ORIENTATION
ORIENTATION: LOWER
ORIENTATION: LOWER

## 2024-02-09 NOTE — LACTATION NOTE
This note was copied from a baby's chart.  Infant nursing at the time of my visit; deep latch noted with consistent rhythmic sucking. Infant pulls off frequently and relatches, appearing frustrated that he is not getting more from breast. Mom states he has been nursing on both breasts at each feeding. Infant's cry sounds dry. Voiding and stooling adequately. Due to infant not settling at breast, I recommend supplementation. Mom chooses formula. Instructed mom to feed infant at breast first, then follow up with small amounts of formula as needed.

## 2024-02-09 NOTE — PROGRESS NOTES
Discharge education provided to patient and family. Patient verbalized understanding. Will schedule appointment for one week to follow up with OB in regards to blood pressure. Patient verbalized understanding of circumcision care for baby and has a follow up appointment with pediatrician scheduled for Saturday, February 10.

## 2024-02-09 NOTE — DISCHARGE INSTRUCTIONS
POST DELIVERY DISCHARGE INSTRUCTIONS    Name: Beatrice Soto  YOB: 1990  Primary Diagnosis: s/p  section, pre eclampsia w/out severe features    General:     Diet/Diet Restrictions:  Eight 8-ounce glasses of fluid daily (water, juices); avoid excessive caffeine intake.  Meals/snacks as desired which are high in fiber and carbohydrates and low in fat and cholesterol.      Physical Activity / Restrictions / Safety:     Avoid heavy lifting, no more that 8 lbs. For 2-3 weeks;   Limit use of stairs to 2 times daily for the first week home.   No driving for one week.  Avoid intercourse 4-6 weeks, no douching or tampon use.   Check with obstetrician before starting or resuming an exercise program.      Discharge Instructions/Special Treatment/Home Care Needs:     Continue prenatal vitamins.  Continue to use squirt bottle with warm water on your episiotomy after each bathroom use until bleeding stops.  If steri-strips applied to your incision, remove in 7-10 days.    Call your doctor for the following:     Fever over 101 degrees by mouth.  Vaginal bleeding heavier than a normal menstrual period or clots larger than a golf ball.  Red streaks or increased swelling of legs, painful red streaks on your breast.  Painful urination, constipation and increased pain or swelling or discharge with your incision.  If you feel extremely anxious or overwhelmed.  If you have thoughts of harming yourself and/or your baby.    Pain Management:     Take Acetaminophen (Tylenol) or Ibuprofen (Advil, Motrin), as directed for pain.   Use a warm Sitz bath 3 times daily to relieve episiotomy or hemorrhoidal discomfort.   For hemorrhoidal discomfort, use Tucks and Anusol cream as needed and directed.  Heating pad to  incision as needed.     Follow-Up Care:     Appointment with MD: 1 week for BP check  Telephone number: [phone]    Signed By: Edwina Matta MD

## 2024-02-09 NOTE — DISCHARGE SUMMARY
Obstetrical Discharge Summary     Name: Beatrice Soto MRN: [id number]  SSN: [id number]    YOB: 1990  Age: 33 y.o.  Sex: female      Admit Date: 2024    Discharge Date: 2024     Admitting Physician: Johanny Mansfield MD     Attending Physician:  Edwina Matta MD     Admission Diagnoses: High risk pregnancy, antepartum [O09.90]    Discharge Diagnoses:   Information for the patient's :  Dhruv Soto [[id number]]   @[id number]@      Additional Diagnoses:  No components found for: \"OBEXTABORH\", \"OBEXTABSCRN\", \"OBEXTRUBELLA\", \"OBEXTGRBS\"    Hospital Course: Normal hospital course following the delivery.  She was diagnosed with Pre eclampsia w/out SF in labor however BP normalized after delivery.  She will need a 1 week BP check in the office postpartum.    Patient Instructions:   Current Discharge Medication List        START taking these medications    Details   oxyCODONE (ROXICODONE) 5 MG immediate release tablet Take 1 tablet by mouth every 6 hours as needed for Pain for up to 7 days. Max Daily Amount: 20 mg  Qty: 18 tablet, Refills: 0    Comments: Reduce doses taken as pain becomes manageable  Associated Diagnoses: S/P  section      ibuprofen (ADVIL;MOTRIN) 800 MG tablet Take 1 tablet by mouth every 8 hours as needed for Pain  Qty: 50 tablet, Refills: 1      docusate sodium (COLACE, DULCOLAX) 100 MG CAPS Take 100 mg by mouth 2 times daily as needed for Constipation  Qty: 60 capsule, Refills: 1           STOP taking these medications       Prenatal MV-Min-Fe Fum-FA-DHA (PRENATAL 1 PO) Comments:   Reason for Stopping:         aspirin 81 MG chewable tablet Comments:   Reason for Stopping:               Disposition at Discharge: Home or self care    Condition at Discharge: Stable    Reference my discharge instructions.    No follow-ups on file.     Signed By:  Edwina Matta MD     2024